# Patient Record
Sex: FEMALE | Race: WHITE | NOT HISPANIC OR LATINO | Employment: OTHER | URBAN - METROPOLITAN AREA
[De-identification: names, ages, dates, MRNs, and addresses within clinical notes are randomized per-mention and may not be internally consistent; named-entity substitution may affect disease eponyms.]

---

## 2017-06-12 ENCOUNTER — CONVERSION ENCOUNTER (OUTPATIENT)
Dept: GENERAL RADIOLOGY | Facility: HOSPITAL | Age: 72
End: 2017-06-12

## 2018-06-07 ENCOUNTER — CONVERSION ENCOUNTER (OUTPATIENT)
Dept: GENERAL RADIOLOGY | Facility: HOSPITAL | Age: 73
End: 2018-06-07

## 2018-09-17 ENCOUNTER — OFFICE VISIT CONVERTED (OUTPATIENT)
Dept: ORTHOPEDIC SURGERY | Facility: CLINIC | Age: 73
End: 2018-09-17
Attending: ORTHOPAEDIC SURGERY

## 2018-10-24 ENCOUNTER — OFFICE VISIT CONVERTED (OUTPATIENT)
Dept: ORTHOPEDIC SURGERY | Facility: CLINIC | Age: 73
End: 2018-10-24
Attending: ORTHOPAEDIC SURGERY

## 2018-11-14 ENCOUNTER — OFFICE VISIT CONVERTED (OUTPATIENT)
Dept: ORTHOPEDIC SURGERY | Facility: CLINIC | Age: 73
End: 2018-11-14
Attending: PHYSICIAN ASSISTANT

## 2018-11-14 ENCOUNTER — CONVERSION ENCOUNTER (OUTPATIENT)
Dept: ORTHOPEDIC SURGERY | Facility: CLINIC | Age: 73
End: 2018-11-14

## 2018-12-26 ENCOUNTER — OFFICE VISIT CONVERTED (OUTPATIENT)
Dept: ORTHOPEDIC SURGERY | Facility: CLINIC | Age: 73
End: 2018-12-26
Attending: PHYSICIAN ASSISTANT

## 2019-02-06 ENCOUNTER — OFFICE VISIT CONVERTED (OUTPATIENT)
Dept: ORTHOPEDIC SURGERY | Facility: CLINIC | Age: 74
End: 2019-02-06
Attending: PHYSICIAN ASSISTANT

## 2019-02-06 ENCOUNTER — CONVERSION ENCOUNTER (OUTPATIENT)
Dept: ORTHOPEDIC SURGERY | Facility: CLINIC | Age: 74
End: 2019-02-06

## 2019-02-12 ENCOUNTER — HOSPITAL ENCOUNTER (OUTPATIENT)
Dept: ORTHOPEDIC SURGERY | Facility: CLINIC | Age: 74
Setting detail: RECURRING SERIES
Discharge: HOME | End: 2019-03-21
Attending: ORTHOPAEDIC SURGERY

## 2019-03-06 ENCOUNTER — OFFICE VISIT CONVERTED (OUTPATIENT)
Dept: ORTHOPEDIC SURGERY | Facility: CLINIC | Age: 74
End: 2019-03-06
Attending: PHYSICIAN ASSISTANT

## 2019-03-12 ENCOUNTER — HOSPITAL ENCOUNTER (OUTPATIENT)
Dept: GENERAL RADIOLOGY | Facility: HOSPITAL | Age: 74
Discharge: HOME | End: 2019-03-12
Attending: PHYSICIAN ASSISTANT

## 2019-03-13 ENCOUNTER — CONVERSION ENCOUNTER (OUTPATIENT)
Dept: ORTHOPEDIC SURGERY | Facility: CLINIC | Age: 74
End: 2019-03-13

## 2019-03-13 ENCOUNTER — OFFICE VISIT CONVERTED (OUTPATIENT)
Dept: ORTHOPEDIC SURGERY | Facility: CLINIC | Age: 74
End: 2019-03-13
Attending: PHYSICIAN ASSISTANT

## 2019-06-13 ENCOUNTER — HOSPITAL ENCOUNTER (OUTPATIENT)
Dept: URGENT CARE | Facility: CLINIC | Age: 74
Discharge: HOME | End: 2019-06-13
Attending: NURSE PRACTITIONER

## 2019-07-10 ENCOUNTER — HOSPITAL ENCOUNTER (OUTPATIENT)
Dept: GENERAL RADIOLOGY | Facility: HOSPITAL | Age: 74
Discharge: HOME | End: 2019-07-10
Attending: NURSE PRACTITIONER

## 2019-07-30 ENCOUNTER — HOSPITAL ENCOUNTER (OUTPATIENT)
Dept: LAB | Facility: HOSPITAL | Age: 74
Discharge: HOME | End: 2019-07-30
Attending: INTERNAL MEDICINE

## 2019-07-30 LAB
25(OH)D3 SERPL-MCNC: 31.5 NG/ML (ref 30–100)
ALBUMIN SERPL-MCNC: 4 G/DL (ref 3.5–5)
ANION GAP SERPL CALC-SCNC: 22 MMOL/L (ref 8–19)
APPEARANCE UR: CLEAR
BASOPHILS # BLD AUTO: 0.03 10*3/UL (ref 0–0.2)
BASOPHILS NFR BLD AUTO: 0.5 % (ref 0–3)
BILIRUB UR QL: NEGATIVE
BUN SERPL-MCNC: 34 MG/DL (ref 5–25)
BUN/CREAT SERPL: 22 {RATIO} (ref 6–20)
CALCIUM SERPL-MCNC: 9.9 MG/DL (ref 8.7–10.4)
CHLORIDE SERPL-SCNC: 104 MMOL/L (ref 99–111)
COLOR UR: YELLOW
CONV ABS IMM GRAN: 0.05 10*3/UL (ref 0–0.2)
CONV CO2: 17 MMOL/L (ref 22–32)
CONV COLLECTION SOURCE (UA): NORMAL
CONV CREATININE URINE, RANDOM: 53 MG/DL (ref 10–300)
CONV IMMATURE GRAN: 0.8 % (ref 0–1.8)
CONV UROBILINOGEN IN URINE BY AUTOMATED TEST STRIP: 0.2 {EHRLICHU}/DL (ref 0.1–1)
CREAT UR-MCNC: 1.52 MG/DL (ref 0.5–0.9)
DEPRECATED RDW RBC AUTO: 52.3 FL (ref 36.4–46.3)
EOSINOPHIL # BLD AUTO: 0.14 10*3/UL (ref 0–0.7)
EOSINOPHIL # BLD AUTO: 2.2 % (ref 0–7)
ERYTHROCYTE [DISTWIDTH] IN BLOOD BY AUTOMATED COUNT: 13.7 % (ref 11.7–14.4)
GFR SERPLBLD BASED ON 1.73 SQ M-ARVRAT: 34 ML/MIN/{1.73_M2}
GLUCOSE SERPL-MCNC: 103 MG/DL (ref 65–99)
GLUCOSE UR QL: NEGATIVE MG/DL
HBA1C MFR BLD: 11.8 G/DL (ref 12–16)
HCT VFR BLD AUTO: 37.8 % (ref 37–47)
HGB UR QL STRIP: NEGATIVE
KETONES UR QL STRIP: NEGATIVE MG/DL
LEUKOCYTE ESTERASE UR QL STRIP: NEGATIVE
LYMPHOCYTES # BLD AUTO: 2.18 10*3/UL (ref 1–5)
MCH RBC QN AUTO: 32.7 PG (ref 27–31)
MCHC RBC AUTO-ENTMCNC: 31.2 G/DL (ref 33–37)
MCV RBC AUTO: 104.7 FL (ref 81–99)
MONOCYTES # BLD AUTO: 0.37 10*3/UL (ref 0.2–1.2)
MONOCYTES NFR BLD AUTO: 5.8 % (ref 3–10)
NEUTROPHILS # BLD AUTO: 3.6 10*3/UL (ref 2–8)
NEUTROPHILS NFR BLD AUTO: 56.5 % (ref 30–85)
NITRITE UR QL STRIP: NEGATIVE
NRBC CBCN: 0 % (ref 0–0.7)
PH UR STRIP.AUTO: 5.5 [PH] (ref 5–8)
PHOSPHATE SERPL-MCNC: 3.5 MG/DL (ref 2.4–4.5)
PLATELET # BLD AUTO: 256 10*3/UL (ref 130–400)
PMV BLD AUTO: 10 FL (ref 9.4–12.3)
POTASSIUM SERPL-SCNC: 5.9 MMOL/L (ref 3.5–5.3)
PROT UR QL: NEGATIVE MG/DL
PROT UR-MCNC: 5 MG/DL
PTH-INTACT SERPL-MCNC: 24.4 PG/ML (ref 11.1–79.5)
RBC # BLD AUTO: 3.61 10*6/UL (ref 4.2–5.4)
SODIUM SERPL-SCNC: 137 MMOL/L (ref 135–147)
SP GR UR: 1.02 (ref 1–1.03)
VARIANT LYMPHS NFR BLD MANUAL: 34.2 % (ref 20–45)
WBC # BLD AUTO: 6.37 10*3/UL (ref 4.8–10.8)

## 2019-08-21 ENCOUNTER — HOSPITAL ENCOUNTER (OUTPATIENT)
Dept: LAB | Facility: HOSPITAL | Age: 74
Discharge: HOME | End: 2019-08-21
Attending: INTERNAL MEDICINE

## 2019-08-21 LAB
ANION GAP SERPL CALC-SCNC: 17 MMOL/L (ref 8–19)
BUN SERPL-MCNC: 24 MG/DL (ref 5–25)
BUN/CREAT SERPL: 25 {RATIO} (ref 6–20)
CALCIUM SERPL-MCNC: 9.3 MG/DL (ref 8.7–10.4)
CHLORIDE SERPL-SCNC: 104 MMOL/L (ref 99–111)
CONV CO2: 21 MMOL/L (ref 22–32)
CREAT UR-MCNC: 0.97 MG/DL (ref 0.5–0.9)
GFR SERPLBLD BASED ON 1.73 SQ M-ARVRAT: 58 ML/MIN/{1.73_M2}
GLUCOSE SERPL-MCNC: 104 MG/DL (ref 65–99)
OSMOLALITY SERPL CALC.SUM OF ELEC: 290 MOSM/KG (ref 273–304)
POTASSIUM SERPL-SCNC: 4.4 MMOL/L (ref 3.5–5.3)
SODIUM SERPL-SCNC: 138 MMOL/L (ref 135–147)

## 2019-11-11 ENCOUNTER — HOSPITAL ENCOUNTER (OUTPATIENT)
Dept: LAB | Facility: HOSPITAL | Age: 74
Discharge: HOME | End: 2019-11-11
Attending: INTERNAL MEDICINE

## 2019-11-11 LAB
ALBUMIN SERPL-MCNC: 4.2 G/DL (ref 3.5–5)
ANION GAP SERPL CALC-SCNC: 20 MMOL/L (ref 8–19)
BASOPHILS # BLD AUTO: 0.04 10*3/UL (ref 0–0.2)
BASOPHILS NFR BLD AUTO: 0.7 % (ref 0–3)
BUN SERPL-MCNC: 24 MG/DL (ref 5–25)
BUN/CREAT SERPL: 22 {RATIO} (ref 6–20)
CALCIUM SERPL-MCNC: 9.6 MG/DL (ref 8.7–10.4)
CHLORIDE SERPL-SCNC: 102 MMOL/L (ref 99–111)
CONV ABS IMM GRAN: 0.02 10*3/UL (ref 0–0.2)
CONV CO2: 22 MMOL/L (ref 22–32)
CONV IMMATURE GRAN: 0.4 % (ref 0–1.8)
CREAT UR-MCNC: 1.09 MG/DL (ref 0.5–0.9)
DEPRECATED RDW RBC AUTO: 51.5 FL (ref 36.4–46.3)
EOSINOPHIL # BLD AUTO: 0.07 10*3/UL (ref 0–0.7)
EOSINOPHIL # BLD AUTO: 1.2 % (ref 0–7)
ERYTHROCYTE [DISTWIDTH] IN BLOOD BY AUTOMATED COUNT: 13 % (ref 11.7–14.4)
GFR SERPLBLD BASED ON 1.73 SQ M-ARVRAT: 50 ML/MIN/{1.73_M2}
GLUCOSE SERPL-MCNC: 112 MG/DL (ref 65–99)
HCT VFR BLD AUTO: 39.4 % (ref 37–47)
HGB BLD-MCNC: 12.4 G/DL (ref 12–16)
LYMPHOCYTES # BLD AUTO: 1.6 10*3/UL (ref 1–5)
LYMPHOCYTES NFR BLD AUTO: 28.4 % (ref 20–45)
MCH RBC QN AUTO: 33.9 PG (ref 27–31)
MCHC RBC AUTO-ENTMCNC: 31.5 G/DL (ref 33–37)
MCV RBC AUTO: 107.7 FL (ref 81–99)
MONOCYTES # BLD AUTO: 0.41 10*3/UL (ref 0.2–1.2)
MONOCYTES NFR BLD AUTO: 7.3 % (ref 3–10)
NEUTROPHILS # BLD AUTO: 3.5 10*3/UL (ref 2–8)
NEUTROPHILS NFR BLD AUTO: 62 % (ref 30–85)
NRBC CBCN: 0 % (ref 0–0.7)
PHOSPHATE SERPL-MCNC: 2.9 MG/DL (ref 2.4–4.5)
PLATELET # BLD AUTO: 219 10*3/UL (ref 130–400)
PMV BLD AUTO: 10 FL (ref 9.4–12.3)
POTASSIUM SERPL-SCNC: 3.9 MMOL/L (ref 3.5–5.3)
RBC # BLD AUTO: 3.66 10*6/UL (ref 4.2–5.4)
SODIUM SERPL-SCNC: 140 MMOL/L (ref 135–147)
WBC # BLD AUTO: 5.64 10*3/UL (ref 4.8–10.8)

## 2020-03-11 ENCOUNTER — HOSPITAL ENCOUNTER (OUTPATIENT)
Dept: OTHER | Facility: HOSPITAL | Age: 75
Discharge: HOME | End: 2020-03-11

## 2020-03-11 LAB
25(OH)D3 SERPL-MCNC: 24.3 NG/ML (ref 30–100)
ALBUMIN SERPL-MCNC: 3.5 G/DL (ref 3.5–5)
ALBUMIN/GLOB SERPL: 1.1 {RATIO} (ref 1.4–2.6)
ALP SERPL-CCNC: 60 U/L (ref 43–160)
ALT SERPL-CCNC: 16 U/L (ref 10–40)
ANION GAP SERPL CALC-SCNC: 20 MMOL/L (ref 8–19)
AST SERPL-CCNC: 28 U/L (ref 15–50)
BASOPHILS # BLD AUTO: 0.03 10*3/UL (ref 0–0.2)
BASOPHILS NFR BLD AUTO: 0.4 % (ref 0–3)
BILIRUB SERPL-MCNC: 0.32 MG/DL (ref 0.2–1.3)
BUN SERPL-MCNC: 33 MG/DL (ref 5–25)
BUN/CREAT SERPL: 31 {RATIO} (ref 6–20)
CALCIUM SERPL-MCNC: 10 MG/DL (ref 8.7–10.4)
CHLORIDE SERPL-SCNC: 104 MMOL/L (ref 99–111)
CONV ABS IMM GRAN: 0.03 10*3/UL (ref 0–0.2)
CONV CO2: 22 MMOL/L (ref 22–32)
CONV IMMATURE GRAN: 0.4 % (ref 0–1.8)
CONV TOTAL PROTEIN: 6.6 G/DL (ref 6.3–8.2)
CREAT UR-MCNC: 1.08 MG/DL (ref 0.5–0.9)
DEPRECATED RDW RBC AUTO: 46.5 FL (ref 36.4–46.3)
EOSINOPHIL # BLD AUTO: 0.21 10*3/UL (ref 0–0.7)
EOSINOPHIL # BLD AUTO: 2.8 % (ref 0–7)
ERYTHROCYTE [DISTWIDTH] IN BLOOD BY AUTOMATED COUNT: 12.2 % (ref 11.7–14.4)
GFR SERPLBLD BASED ON 1.73 SQ M-ARVRAT: 51 ML/MIN/{1.73_M2}
GLOBULIN UR ELPH-MCNC: 3.1 G/DL (ref 2–3.5)
GLUCOSE SERPL-MCNC: 119 MG/DL (ref 65–99)
HCT VFR BLD AUTO: 37.2 % (ref 37–47)
HGB BLD-MCNC: 11.9 G/DL (ref 12–16)
LYMPHOCYTES # BLD AUTO: 2.14 10*3/UL (ref 1–5)
LYMPHOCYTES NFR BLD AUTO: 28.4 % (ref 20–45)
MCH RBC QN AUTO: 33.1 PG (ref 27–31)
MCHC RBC AUTO-ENTMCNC: 32 G/DL (ref 33–37)
MCV RBC AUTO: 103.3 FL (ref 81–99)
MONOCYTES # BLD AUTO: 0.72 10*3/UL (ref 0.2–1.2)
MONOCYTES NFR BLD AUTO: 9.6 % (ref 3–10)
NEUTROPHILS # BLD AUTO: 4.4 10*3/UL (ref 2–8)
NEUTROPHILS NFR BLD AUTO: 58.4 % (ref 30–85)
NRBC CBCN: 0 % (ref 0–0.7)
OSMOLALITY SERPL CALC.SUM OF ELEC: 302 MOSM/KG (ref 273–304)
PLATELET # BLD AUTO: 191 10*3/UL (ref 130–400)
PMV BLD AUTO: 10.3 FL (ref 9.4–12.3)
POTASSIUM SERPL-SCNC: 4.4 MMOL/L (ref 3.5–5.3)
RBC # BLD AUTO: 3.6 10*6/UL (ref 4.2–5.4)
SODIUM SERPL-SCNC: 142 MMOL/L (ref 135–147)
WBC # BLD AUTO: 7.53 10*3/UL (ref 4.8–10.8)

## 2020-03-19 ENCOUNTER — HOSPITAL ENCOUNTER (OUTPATIENT)
Dept: OTHER | Facility: HOSPITAL | Age: 75
Discharge: HOME | End: 2020-03-19

## 2020-03-19 LAB
ANION GAP SERPL CALC-SCNC: 14 MMOL/L (ref 8–19)
BUN SERPL-MCNC: 38 MG/DL (ref 5–25)
BUN/CREAT SERPL: 39 {RATIO} (ref 6–20)
CALCIUM SERPL-MCNC: 8.7 MG/DL (ref 8.7–10.4)
CHLORIDE SERPL-SCNC: 103 MMOL/L (ref 99–111)
CONV CO2: 23 MMOL/L (ref 22–32)
CREAT UR-MCNC: 0.97 MG/DL (ref 0.5–0.9)
GFR SERPLBLD BASED ON 1.73 SQ M-ARVRAT: 58 ML/MIN/{1.73_M2}
GLUCOSE SERPL-MCNC: 97 MG/DL (ref 65–99)
OSMOLALITY SERPL CALC.SUM OF ELEC: 291 MOSM/KG (ref 273–304)
POTASSIUM SERPL-SCNC: 3.8 MMOL/L (ref 3.5–5.3)
SODIUM SERPL-SCNC: 136 MMOL/L (ref 135–147)

## 2020-03-26 ENCOUNTER — OFFICE VISIT CONVERTED (OUTPATIENT)
Dept: ORTHOPEDIC SURGERY | Facility: CLINIC | Age: 75
End: 2020-03-26
Attending: ORTHOPAEDIC SURGERY

## 2020-05-06 ENCOUNTER — HOSPITAL ENCOUNTER (OUTPATIENT)
Dept: LAB | Facility: HOSPITAL | Age: 75
Discharge: HOME | End: 2020-05-06
Attending: INTERNAL MEDICINE

## 2020-05-06 LAB
25(OH)D3 SERPL-MCNC: 39.7 NG/ML (ref 30–100)
ALBUMIN SERPL-MCNC: 4.5 G/DL (ref 3.5–5)
ANION GAP SERPL CALC-SCNC: 26 MMOL/L (ref 8–19)
APPEARANCE UR: CLEAR
BASOPHILS # BLD AUTO: 0.06 10*3/UL (ref 0–0.2)
BASOPHILS NFR BLD AUTO: 0.8 % (ref 0–3)
BILIRUB UR QL: NEGATIVE
BUN SERPL-MCNC: 38 MG/DL (ref 5–25)
BUN/CREAT SERPL: 35 {RATIO} (ref 6–20)
CALCIUM SERPL-MCNC: 10.7 MG/DL (ref 8.7–10.4)
CHLORIDE SERPL-SCNC: 110 MMOL/L (ref 99–111)
COLOR UR: YELLOW
CONV ABS IMM GRAN: 0.02 10*3/UL (ref 0–0.2)
CONV BACTERIA: ABNORMAL
CONV CO2: 18 MMOL/L (ref 22–32)
CONV COLLECTION SOURCE (UA): ABNORMAL
CONV CREATININE URINE, RANDOM: 74.1 MG/DL (ref 10–300)
CONV HYALINE CASTS IN URINE MICRO: ABNORMAL /[LPF]
CONV IMMATURE GRAN: 0.3 % (ref 0–1.8)
CONV PROTEIN TO CREATININE RATIO (RANDOM URINE): 0.1 {RATIO} (ref 0–0.1)
CONV UROBILINOGEN IN URINE BY AUTOMATED TEST STRIP: 0.2 {EHRLICHU}/DL (ref 0.1–1)
CREAT UR-MCNC: 1.08 MG/DL (ref 0.5–0.9)
DEPRECATED RDW RBC AUTO: 50.5 FL (ref 36.4–46.3)
EOSINOPHIL # BLD AUTO: 0.24 10*3/UL (ref 0–0.7)
EOSINOPHIL # BLD AUTO: 3.2 % (ref 0–7)
ERYTHROCYTE [DISTWIDTH] IN BLOOD BY AUTOMATED COUNT: 12.8 % (ref 11.7–14.4)
GFR SERPLBLD BASED ON 1.73 SQ M-ARVRAT: 51 ML/MIN/{1.73_M2}
GLUCOSE SERPL-MCNC: 120 MG/DL (ref 65–99)
GLUCOSE UR QL: NEGATIVE MG/DL
HCT VFR BLD AUTO: 43.4 % (ref 37–47)
HGB BLD-MCNC: 13.7 G/DL (ref 12–16)
HGB UR QL STRIP: NEGATIVE
KETONES UR QL STRIP: NEGATIVE MG/DL
LEUKOCYTE ESTERASE UR QL STRIP: ABNORMAL
LYMPHOCYTES # BLD AUTO: 2.08 10*3/UL (ref 1–5)
LYMPHOCYTES NFR BLD AUTO: 27.5 % (ref 20–45)
MCH RBC QN AUTO: 33.6 PG (ref 27–31)
MCHC RBC AUTO-ENTMCNC: 31.6 G/DL (ref 33–37)
MCV RBC AUTO: 106.4 FL (ref 81–99)
MONOCYTES # BLD AUTO: 0.49 10*3/UL (ref 0.2–1.2)
MONOCYTES NFR BLD AUTO: 6.5 % (ref 3–10)
NEUTROPHILS # BLD AUTO: 4.66 10*3/UL (ref 2–8)
NEUTROPHILS NFR BLD AUTO: 61.7 % (ref 30–85)
NITRITE UR QL STRIP: NEGATIVE
NRBC CBCN: 0 % (ref 0–0.7)
PH UR STRIP.AUTO: 5.5 [PH] (ref 5–8)
PHOSPHATE SERPL-MCNC: 3.2 MG/DL (ref 2.4–4.5)
PLATELET # BLD AUTO: 216 10*3/UL (ref 130–400)
PMV BLD AUTO: 10.5 FL (ref 9.4–12.3)
POTASSIUM SERPL-SCNC: 5.1 MMOL/L (ref 3.5–5.3)
PROT UR QL: NEGATIVE MG/DL
PROT UR-MCNC: 7.1 MG/DL
PTH-INTACT SERPL-MCNC: 26.1 PG/ML (ref 11.1–79.5)
RBC # BLD AUTO: 4.08 10*6/UL (ref 4.2–5.4)
RBC #/AREA URNS HPF: ABNORMAL /[HPF]
SODIUM SERPL-SCNC: 149 MMOL/L (ref 135–147)
SP GR UR: 1.02 (ref 1–1.03)
WBC # BLD AUTO: 7.55 10*3/UL (ref 4.8–10.8)
WBC #/AREA URNS HPF: ABNORMAL /[HPF]

## 2020-05-14 ENCOUNTER — OFFICE VISIT CONVERTED (OUTPATIENT)
Dept: ORTHOPEDIC SURGERY | Facility: CLINIC | Age: 75
End: 2020-05-14
Attending: ORTHOPAEDIC SURGERY

## 2020-10-20 ENCOUNTER — HOSPITAL ENCOUNTER (OUTPATIENT)
Dept: GENERAL RADIOLOGY | Facility: HOSPITAL | Age: 75
Discharge: HOME | End: 2020-10-20
Attending: NURSE PRACTITIONER

## 2020-11-03 ENCOUNTER — HOSPITAL ENCOUNTER (OUTPATIENT)
Dept: LAB | Facility: HOSPITAL | Age: 75
Discharge: HOME | End: 2020-11-03
Attending: NURSE PRACTITIONER

## 2020-11-03 LAB
25(OH)D3 SERPL-MCNC: 26.2 NG/ML (ref 30–100)
ALBUMIN SERPL-MCNC: 4.4 G/DL (ref 3.5–5)
ANION GAP SERPL CALC-SCNC: 19 MMOL/L (ref 8–19)
APPEARANCE UR: CLEAR
BASOPHILS # BLD AUTO: 0.04 10*3/UL (ref 0–0.2)
BASOPHILS NFR BLD AUTO: 0.6 % (ref 0–3)
BILIRUB UR QL: NEGATIVE
BUN SERPL-MCNC: 28 MG/DL (ref 5–25)
BUN/CREAT SERPL: 24 {RATIO} (ref 6–20)
CALCIUM SERPL-MCNC: 9.6 MG/DL (ref 8.7–10.4)
CHLORIDE SERPL-SCNC: 103 MMOL/L (ref 99–111)
COLOR UR: YELLOW
CONV ABS IMM GRAN: 0.01 10*3/UL (ref 0–0.2)
CONV BACTERIA: NEGATIVE
CONV CO2: 21 MMOL/L (ref 22–32)
CONV COLLECTION SOURCE (UA): ABNORMAL
CONV CREATININE URINE, RANDOM: 52.1 MG/DL (ref 10–300)
CONV IMMATURE GRAN: 0.2 % (ref 0–1.8)
CONV PROTEIN TO CREATININE RATIO (RANDOM URINE): 0.09 {RATIO} (ref 0–0.1)
CONV UROBILINOGEN IN URINE BY AUTOMATED TEST STRIP: 0.2 {EHRLICHU}/DL (ref 0.1–1)
CREAT UR-MCNC: 1.17 MG/DL (ref 0.5–0.9)
DEPRECATED RDW RBC AUTO: 47.7 FL (ref 36.4–46.3)
EOSINOPHIL # BLD AUTO: 0.16 10*3/UL (ref 0–0.7)
EOSINOPHIL # BLD AUTO: 2.6 % (ref 0–7)
ERYTHROCYTE [DISTWIDTH] IN BLOOD BY AUTOMATED COUNT: 12.5 % (ref 11.7–14.4)
GFR SERPLBLD BASED ON 1.73 SQ M-ARVRAT: 46 ML/MIN/{1.73_M2}
GLUCOSE SERPL-MCNC: 116 MG/DL (ref 65–99)
GLUCOSE UR QL: NEGATIVE MG/DL
HCT VFR BLD AUTO: 45.6 % (ref 37–47)
HGB BLD-MCNC: 14.7 G/DL (ref 12–16)
HGB UR QL STRIP: ABNORMAL
KETONES UR QL STRIP: NEGATIVE MG/DL
LEUKOCYTE ESTERASE UR QL STRIP: ABNORMAL
LYMPHOCYTES # BLD AUTO: 2.21 10*3/UL (ref 1–5)
LYMPHOCYTES NFR BLD AUTO: 35.4 % (ref 20–45)
MCH RBC QN AUTO: 33.1 PG (ref 27–31)
MCHC RBC AUTO-ENTMCNC: 32.2 G/DL (ref 33–37)
MCV RBC AUTO: 102.7 FL (ref 81–99)
MONOCYTES # BLD AUTO: 0.4 10*3/UL (ref 0.2–1.2)
MONOCYTES NFR BLD AUTO: 6.4 % (ref 3–10)
NEUTROPHILS # BLD AUTO: 3.43 10*3/UL (ref 2–8)
NEUTROPHILS NFR BLD AUTO: 54.8 % (ref 30–85)
NITRITE UR QL STRIP: NEGATIVE
NRBC CBCN: 0 % (ref 0–0.7)
PH UR STRIP.AUTO: 6.5 [PH] (ref 5–8)
PHOSPHATE SERPL-MCNC: 3.3 MG/DL (ref 2.4–4.5)
PLATELET # BLD AUTO: 234 10*3/UL (ref 130–400)
PMV BLD AUTO: 10.2 FL (ref 9.4–12.3)
POTASSIUM SERPL-SCNC: 4 MMOL/L (ref 3.5–5.3)
PROT UR QL: NEGATIVE MG/DL
PROT UR-MCNC: 4.5 MG/DL
PTH-INTACT SERPL-MCNC: 40.5 PG/ML (ref 11.1–79.5)
RBC # BLD AUTO: 4.44 10*6/UL (ref 4.2–5.4)
RBC #/AREA URNS HPF: ABNORMAL /[HPF]
SODIUM SERPL-SCNC: 139 MMOL/L (ref 135–147)
SP GR UR: 1.01 (ref 1–1.03)
SQUAMOUS SPT QL MICRO: ABNORMAL /[HPF]
WBC # BLD AUTO: 6.25 10*3/UL (ref 4.8–10.8)
WBC #/AREA URNS HPF: ABNORMAL /[HPF]

## 2021-02-24 ENCOUNTER — HOSPITAL ENCOUNTER (OUTPATIENT)
Dept: VACCINE CLINIC | Facility: HOSPITAL | Age: 76
Discharge: HOME | End: 2021-02-24
Attending: INTERNAL MEDICINE

## 2021-03-10 ENCOUNTER — HOSPITAL ENCOUNTER (OUTPATIENT)
Dept: NUCLEAR MEDICINE | Facility: HOSPITAL | Age: 76
Discharge: HOME | End: 2021-03-10
Attending: NURSE PRACTITIONER

## 2021-03-15 ENCOUNTER — OFFICE VISIT CONVERTED (OUTPATIENT)
Dept: SURGERY | Facility: CLINIC | Age: 76
End: 2021-03-15
Attending: SURGERY

## 2021-03-15 ENCOUNTER — HOSPITAL ENCOUNTER (OUTPATIENT)
Dept: PREADMISSION TESTING | Facility: HOSPITAL | Age: 76
Discharge: HOME | End: 2021-03-15
Attending: SURGERY

## 2021-03-16 LAB — SARS-COV-2 RNA SPEC QL NAA+PROBE: NOT DETECTED

## 2021-03-18 ENCOUNTER — HOSPITAL ENCOUNTER (OUTPATIENT)
Dept: PERIOP | Facility: HOSPITAL | Age: 76
Setting detail: HOSPITAL OUTPATIENT SURGERY
Discharge: HOME | End: 2021-03-18
Attending: SURGERY

## 2021-03-18 LAB
ANION GAP SERPL CALC-SCNC: 14 MMOL/L (ref 8–19)
BUN SERPL-MCNC: 13 MG/DL (ref 5–25)
BUN/CREAT SERPL: 15 {RATIO} (ref 6–20)
CALCIUM SERPL-MCNC: 9.8 MG/DL (ref 8.7–10.4)
CHLORIDE SERPL-SCNC: 112 MMOL/L (ref 99–111)
CONV CO2: 20 MMOL/L (ref 22–32)
CREAT UR-MCNC: 0.89 MG/DL (ref 0.5–0.9)
GFR SERPLBLD BASED ON 1.73 SQ M-ARVRAT: >60 ML/MIN/{1.73_M2}
GLUCOSE BLD-MCNC: 105 MG/DL (ref 65–99)
GLUCOSE SERPL-MCNC: 113 MG/DL (ref 65–99)
OSMOLALITY SERPL CALC.SUM OF ELEC: 295 MOSM/KG (ref 273–304)
POTASSIUM SERPL-SCNC: 4.2 MMOL/L (ref 3.5–5.3)
SODIUM SERPL-SCNC: 142 MMOL/L (ref 135–147)

## 2021-03-25 ENCOUNTER — HOSPITAL ENCOUNTER (OUTPATIENT)
Dept: VACCINE CLINIC | Facility: HOSPITAL | Age: 76
Discharge: HOME | End: 2021-03-25
Attending: INTERNAL MEDICINE

## 2021-04-02 ENCOUNTER — OFFICE VISIT CONVERTED (OUTPATIENT)
Dept: SURGERY | Facility: CLINIC | Age: 76
End: 2021-04-02
Attending: SURGERY

## 2021-04-26 ENCOUNTER — HOSPITAL ENCOUNTER (OUTPATIENT)
Dept: LAB | Facility: HOSPITAL | Age: 76
Discharge: HOME | End: 2021-04-26
Attending: INTERNAL MEDICINE

## 2021-04-26 LAB
25(OH)D3 SERPL-MCNC: 35.2 NG/ML (ref 30–100)
ALBUMIN SERPL-MCNC: 4.3 G/DL (ref 3.5–5)
ANION GAP SERPL CALC-SCNC: 17 MMOL/L (ref 8–19)
APPEARANCE UR: CLEAR
BASOPHILS # BLD AUTO: 0.04 10*3/UL (ref 0–0.2)
BASOPHILS NFR BLD AUTO: 0.7 % (ref 0–3)
BILIRUB UR QL: NEGATIVE
BUN SERPL-MCNC: 28 MG/DL (ref 5–25)
BUN/CREAT SERPL: 25 {RATIO} (ref 6–20)
CALCIUM SERPL-MCNC: 9.6 MG/DL (ref 8.7–10.4)
CHLORIDE SERPL-SCNC: 103 MMOL/L (ref 99–111)
COLOR UR: YELLOW
CONV ABS IMM GRAN: 0.01 10*3/UL (ref 0–0.2)
CONV CO2: 25 MMOL/L (ref 22–32)
CONV COLLECTION SOURCE (UA): NORMAL
CONV CREATININE URINE, RANDOM: 39 MG/DL (ref 10–300)
CONV IMMATURE GRAN: 0.2 % (ref 0–1.8)
CONV PROTEIN TO CREATININE RATIO (RANDOM URINE): 0.12 {RATIO} (ref 0–0.1)
CONV UROBILINOGEN IN URINE BY AUTOMATED TEST STRIP: 0.2 {EHRLICHU}/DL (ref 0.1–1)
CREAT UR-MCNC: 1.12 MG/DL (ref 0.5–0.9)
DEPRECATED RDW RBC AUTO: 48 FL (ref 36.4–46.3)
EOSINOPHIL # BLD AUTO: 0.65 10*3/UL (ref 0–0.7)
EOSINOPHIL # BLD AUTO: 11 % (ref 0–7)
ERYTHROCYTE [DISTWIDTH] IN BLOOD BY AUTOMATED COUNT: 13.2 % (ref 11.7–14.4)
GFR SERPLBLD BASED ON 1.73 SQ M-ARVRAT: 48 ML/MIN/{1.73_M2}
GLUCOSE SERPL-MCNC: 112 MG/DL (ref 65–99)
GLUCOSE UR QL: NEGATIVE MG/DL
HCT VFR BLD AUTO: 41 % (ref 37–47)
HGB BLD-MCNC: 13.5 G/DL (ref 12–16)
HGB UR QL STRIP: NEGATIVE
KETONES UR QL STRIP: NEGATIVE MG/DL
LEUKOCYTE ESTERASE UR QL STRIP: NEGATIVE
LYMPHOCYTES # BLD AUTO: 1.93 10*3/UL (ref 1–5)
LYMPHOCYTES NFR BLD AUTO: 32.7 % (ref 20–45)
MCH RBC QN AUTO: 32.5 PG (ref 27–31)
MCHC RBC AUTO-ENTMCNC: 32.9 G/DL (ref 33–37)
MCV RBC AUTO: 98.6 FL (ref 81–99)
MONOCYTES # BLD AUTO: 0.4 10*3/UL (ref 0.2–1.2)
MONOCYTES NFR BLD AUTO: 6.8 % (ref 3–10)
NEUTROPHILS # BLD AUTO: 2.88 10*3/UL (ref 2–8)
NEUTROPHILS NFR BLD AUTO: 48.6 % (ref 30–85)
NITRITE UR QL STRIP: NEGATIVE
NRBC CBCN: 0 % (ref 0–0.7)
PH UR STRIP.AUTO: 6.5 [PH] (ref 5–8)
PHOSPHATE SERPL-MCNC: 3.2 MG/DL (ref 2.4–4.5)
PLATELET # BLD AUTO: 204 10*3/UL (ref 130–400)
PMV BLD AUTO: 10.1 FL (ref 9.4–12.3)
POTASSIUM SERPL-SCNC: 4.6 MMOL/L (ref 3.5–5.3)
PROT UR QL: NEGATIVE MG/DL
PROT UR-MCNC: 4.7 MG/DL
PTH-INTACT SERPL-MCNC: 25.4 PG/ML (ref 11.1–79.5)
RBC # BLD AUTO: 4.16 10*6/UL (ref 4.2–5.4)
SODIUM SERPL-SCNC: 140 MMOL/L (ref 135–147)
SP GR UR: 1.01 (ref 1–1.03)
WBC # BLD AUTO: 5.91 10*3/UL (ref 4.8–10.8)

## 2021-05-10 NOTE — H&P
History and Physical      Patient Name: Salome Reeves   Patient ID: [id number]   Sex: Female   YOB: 1945    Primary Care Provider: Julian Polanco MD   Referring Provider: Jennie DALY    Visit Date: March 15, 2021    Provider: Ferny Connors MD   Location: [facility]   Location Address: [address] [place] [address]  [id number]   Location Phone: [phone]          Chief Complaint  · Outpatient History & Physical / Surgical Orders  · Gallbladder Consult      History Of Present Illness  Salome Reeves is a 75 year old /White female who presents to the office today as a consult from Jennie DALY.      Patient was referred for consideration for gallbladder removal.  The patient has been having diarrhea and epigastric pain.  Both occur after every meal regardless of the food type.  CT scan was done and and results were unremarkable except for the presence of gallstones.  Patient also had a HIDA scan done and it showed an ejection fraction of 90%.  Only prior abdominal surgeries are open appendectomy and complete hysterectomy.  Patient does have a small ventral hernia in the midline just above the umbilicus that is been present for many years and does not bother her in any way.  The referring clinicians note indicates that stool specimens were ordered and were negative including culture, Shigella, Giardia, and C. difficile.  I reviewed all of the referring clinicians note.  Patient has a diagnosis of chronic pancreatitis but says she has never drank alcohol very much at all and she had a common bile duct stent placed about 5 years ago that was subsequently removed and the patient said it had something to do with her diagnosis of chronic pancreatitis.  The patient takes Creon daily and has been taking it since the year 2009.  She says she does not have any lung or heart problems.       Past Medical History  Disease Name Date Onset Notes   Arthritis --  --     Bladder disorder --  --    Diabetes --  --    Hyperlipemia --  --    Hypertension --  --    Kidney Disease --  --    Limb Swelling --  --    Neurologic disorder --  --    Seasonal allergies --  --    Thyroid disorder --  --          Past Surgical History  Procedure Name Date Notes   Appendectomy --  --    Cataract surgery --  --    Colonoscopy --  --    Hysterectomy --  --    Parathyroidectomy 2014 --          Medication List  Name Date Started Instructions   Aspir-81 81 mg oral tablet,delayed release (DR/EC)  --    Creon oral  --    glimepiride 2 mg oral tablet  take 1 tablet (2 mg) by oral route once daily   lisinopril oral  --    lovastatin oral  --    metformin 500 mg oral tablet  take 1 tablet (500 mg) by oral route 2 times per day with morning and evening meals   primidone 250 mg oral tablet  take 1 tablet (250 mg) by oral route 3 times per day for maintenance   topiramate oral  --    Vitamin D3 oral  --    Voltaren 1 % topical gel 02/06/2019 apply 4 gram to the affected area(s) by topical route 4 times per day         Allergy List  Allergen Name Date Reaction Notes   Adhesive Tape --  --  --    Augmentin --  --  --          Family Medical History  Disease Name Relative/Age Notes   Cancer, Unspecified Father/  Mother/   Mother; Father   -None  --          Social History  Finding Status Start/Stop Quantity Notes   Alcohol Never 0/0 --  drinks no   Alcohol Use Never --/-- --  does not drink   Caffeine Current every day 0/0 --  drinks regularly; coffee; 1-2 times per day   Claustophobic Unknown --/-- --  yes   Homemaker. --  --/-- --  --    lives with spouse --  --/-- --  --    . --  --/-- --  --    Recreational Drug Use Never --/-- --  no   Retired. --  --/-- --  --    Second hand smoke exposure Never 0/0 --  no   Tobacco Never --/-- --  never smoker  never smoker  never a smoker         Review of Systems  · Constitutional  o Denies  o : fever, chills  · Cardiovascular  o Denies  o : chest pain on  "exertion  · Respiratory  o Denies  o : shortness of breath  · Gastrointestinal  o Admits  o : diarrhea  o Denies  o : nausea      Vitals  Date Time BP Position Site L\R Cuff Size HR RR TEMP (F) WT  HT  BMI kg/m2 BSA m2 O2 Sat FR L/min FiO2 HC       03/15/2021 10:43 AM       16  160lbs 6oz 5'  3\" 28.41 1.8             Physical Examination  · Constitutional  o Appearance  o : alert and in no acute distress, reliable historian,  present in room  · Head and Face  o Head  o :   § Inspection  § : no visable deformities or lesions  · Eyes  o Conjunctivae  o : clear, wearing glasses  o Sclerae  o : clear  · Neck  o Inspection/Palpation  o : normal appearance, no masses, trachea midline  · Respiratory  o Respiratory Effort  o : breathing unlabored, respiratory effort appears normal  o Inspection of Chest  o : normal appearance, no retractions  · Cardiovascular  o Heart  o : regular rate and rhythm  · Gastrointestinal  o Abdominal Examination  o :   § Abdomen  § : soft, nontender, nondistended. reducible nontender ventral hernia in midline above umbilicus  · Skin and Subcutaneous Tissue  o General Inspection  o : no visible concerning rashes or lesions present  · Neurologic  o Cranial Nerves  o : no obvious motor deficits  o Sensation  o : no obvious sensory deficits  o Gait and Station  o :   § Gait Screening  § : normal gait, able to stand without diffculty  o Cerebellar Function  o : no obvious abnormalities  · Psychiatric  o Judgement and Insight  o : judgment and insight intact  o Mood and Affect  o : mood normal, affect appropriate          Assessment  · Pre-Surgical Orders     V72.84  · Cholelithiasis     574.20/K80.20  · Preop testing     V72.84/Z01.818  · Epigastric abdominal pain     789.06/R10.13  · Diarrhea     787.91/R19.7      Plan  · Orders  o GENERAL SURGERY (GNSUR) - V72.84 - 03/18/2021  o [facility] Pre-Op Covid-19 Screening (41103) - V72.84/Z01.818 - 03/15/2021  · Medications  o Medications have been " Reconciled  o Transition of Care or Provider Policy  · Instructions  o PLAN: Laparoscopic Cholecystectomy with Intraoperative Cholangiogram.  o PLEASE SIGN PERMIT FOR: Laparoscopic Cholecystectomy with Intraoperative Cholangiogram  o CMP to be checked STAT on day of surgery if no CMP available within one week of surgery date.  o Anesthesia: General   o Anesthesia: MAC  o Outpatient  o O.R. PREP: Per protocol  o IV: Per Anesthesia  o SCD's preoperatively  o The indications, options, risks, benefits, and expected outcomes of the planned procedure were discussed with the patient and the patient agrees to proceed.   o Electronically Identified Patient Education Materials Provided Electronically     Patient has a diagnosis of chronic pancreatitis, it is unclear whether her abdominal pain will improve/resolve after gallbladder removal.  Also, is very unclear whether the patient's diarrhea will improve/resolve as well.  Nevertheless, the patient is very concerned about the gallstones and feels that the gallstones are the cause of her epigastric abdominal pain and wants to proceed with laparoscopic gallbladder removal with the clear understanding that her symptoms may not improve.             Electronically Signed by: Ferny Connors MD -Author on March 15, 2021 12:14:08 PM

## 2021-05-13 NOTE — PROGRESS NOTES
Progress Note      Patient Name: Salome Reeves   Patient ID: [id number]   Sex: Female   YOB: 1945    Primary Care Provider: Julian Polanco MD   Referring Provider: Carly Shell MD    Visit Date: May 14, 2020    Provider: Todd Tavarez MD   Location: [facility]   Location Address: [address] [place] [address]  [id number]   Location Phone: [phone]          Chief Complaint  · Left hip pain      History Of Present Illness  Salome Reeves is a 74 year old /White female who presents today to [facility].      Patient presents today for evaluation follow up with left greater trochanteric hip fracture. The patient is doing well today. She is having minimal pain in the hip. She notices occasional soreness and pain to the hip, but states that overall it is doing much better. She had to wait in a doctors office today in a chair and says this aggravated it.       Past Medical History  Arthritis; Bladder Disorder; Diabetes; Hyperlipemia; Hypertension; Kidney Disease; Limb Swelling; Neurologic disorder; Seasonal allergies; Thyroid disorder         Past Surgical History  Appendectomy; Cataract surgery; Colonoscopy; Hysterectomy; Parathyroidectomy         Medication List  Aspir-81 81 mg oral tablet,delayed release (DR/EC); Creon oral; Januvia oral; lisinopril oral; lovastatin oral; Percocet 7.5-325 mg oral tablet; topiramate oral; trihexyphenidyl oral; Vitamin D3 oral; Voltaren 1 % topical gel         Allergy List  Adhesive Tape; Augmentin         Family Medical History  Cancer, Unspecified; -None         Social History  Alcohol (Never); Alcohol Use (Never); Caffeine (Current every day); Claustophobic (Unknown); Homemaker.; lives with spouse; .; Recreational Drug Use (Never); Retired.; Second hand smoke exposure (Never); Tobacco (Never)         Review of Systems  · Constitutional  o Denies  o : fever, chills, weight loss  · Cardiovascular  o Denies  o : chest pain,  "shortness of breath  · Gastrointestinal  o Denies  o : liver disease, heartburn, nausea, blood in stools  · Genitourinary  o Denies  o : painful urination, blood in urine  · Integument  o Denies  o : rash, itching  · Neurologic  o Denies  o : headache, weakness, loss of consciousness  · Musculoskeletal  o Denies  o : painful, swollen joints  · Psychiatric  o Denies  o : drug/alcohol addiction, anxiety, depression      Vitals  Date Time BP Position Site L\R Cuff Size HR RR TEMP (F) WT  HT  BMI kg/m2 BSA m2 O2 Sat        05/14/2020 02:50 PM         160lbs 0oz 5'  3\" 28.34 1.8           Physical Examination  · Constitutional  o Appearance  o : well developed, well-nourished, no obvious deformities present  · Head and Face  o Head  o :   § Inspection  § : normocephalic  o Face  o :   § Inspection  § : no facial lesions  · Eyes  o Conjunctivae  o : conjunctivae normal  o Sclerae  o : sclerae white  · Ears, Nose, Mouth and Throat  o Ears  o :   § External Ears  § : appearance within normal limits  § Hearing  § : intact  o Nose  o :   § External Nose  § : appearance normal  · Neck  o Inspection/Palpation  o : normal appearance  o Range of Motion  o : full range of motion  · Respiratory  o Respiratory Effort  o : breathing unlabored  o Inspection of Chest  o : normal appearance  o Auscultation of Lungs  o : no audible wheezing or rales  · Cardiovascular  o Heart  o : regular rate  · Gastrointestinal  o Abdominal Examination  o : soft and non-tender  · Skin and Subcutaneous Tissue  o General Inspection  o : intact, no rashes  · Psychiatric  o General  o : Alert and oriented x3  o Judgement and Insight  o : judgment and insight intact  o Mood and Affect  o : mood normal, affect appropriate  · Left Hip  o Inspection  o : Non tender to palpation. ambulates with mild antalgic gait. ROM is intact. Neurovascularly intact. Sensation is grossly intact.           Assessment  · Greater trochanteric hip fracture, " Left     820.8/S72.009A  · Pain: Hip     719.45/M25.559      Plan  · Medications  o Medications have been Reconciled  o Transition of Care or Provider Policy  · Instructions  o Dr. Tavarez saw and examined the patient and agrees with plan.   o Reviewed the patient's Past Medical, Social, and Family history as well as the ROS at today's visit, no changes.  o Call or return if worsening symptoms.  o This note was transcribed by Juanjose Ferrell. jsb.  o Discussed plan with patient. She is doing well today. She may continue activity as tolerated. She will follow up with us if any problems arise.             Electronically Signed by: Edward Ferrell - , Other -Author on May 20, 2020 03:10:48 PM  Electronically Co-signed by: Todd Tavarez MD -Reviewer on May 20, 2020 08:22:35 PM

## 2021-05-14 VITALS — RESPIRATION RATE: 14 BRPM | WEIGHT: 160 LBS | HEIGHT: 63 IN | BODY MASS INDEX: 28.35 KG/M2

## 2021-05-14 VITALS — BODY MASS INDEX: 28.41 KG/M2 | HEIGHT: 63 IN | WEIGHT: 160.37 LBS | RESPIRATION RATE: 16 BRPM

## 2021-05-14 NOTE — PROGRESS NOTES
"   Progress Note      Patient Name: Salome Reeves   Patient ID: [id number]   Sex: Female   YOB: 1945    Primary Care Provider: Jennie DALY   Referring Provider: Jennie DALY    Visit Date: April 2, 2021    Provider: Ferny Connors MD   Location: [facility]   Location Address: [address] [place] [address]  [id number]   Location Phone: [phone]          Chief Complaint  · Follow up Surgery      History Of Present Illness  Salome Reeves is a 75 year old /White female who presents today for a postoperative visit.      Patient is here for follow-up after gallbladder removal.  She is doing great and has no complaints.  Patient looks fine today.  Abdominal exam is benign and incisions are healing well.  My assessment is the patient is recovering very well after her surgery.  She seems pleased with her progress and there are no new issues to address.  Patient will see me as needed       Vitals  Date Time BP Position Site L\R Cuff Size HR RR TEMP (F) WT  HT  BMI kg/m2 BSA m2 O2 Sat FR L/min FiO2 HC       04/02/2021 01:59 PM       14  160lbs 0oz 5'  3\" 28.34 1.8                 Assessment  · Postoperative Exam Following Surgery     V67.00      Plan  · Medications  o Medications have been Reconciled  o Transition of Care or Provider Policy  · Instructions  o See Above HPI section.  o Electronically Identified Patient Education Materials Provided Electronically            Electronically Signed by: Ferny Connors MD -Author on April 2, 2021 02:26:12 PM  "

## 2021-05-15 VITALS — BODY MASS INDEX: 28.35 KG/M2 | HEIGHT: 63 IN | WEIGHT: 160 LBS

## 2021-05-15 VITALS — HEIGHT: 63 IN | WEIGHT: 150 LBS | RESPIRATION RATE: 16 BRPM | BODY MASS INDEX: 26.58 KG/M2

## 2021-05-16 VITALS — HEART RATE: 96 BPM | HEIGHT: 63 IN | WEIGHT: 166 LBS | OXYGEN SATURATION: 98 % | BODY MASS INDEX: 29.41 KG/M2

## 2021-05-16 VITALS — HEIGHT: 63 IN | WEIGHT: 166 LBS | OXYGEN SATURATION: 98 % | HEART RATE: 105 BPM | BODY MASS INDEX: 29.41 KG/M2

## 2021-05-16 VITALS — HEART RATE: 127 BPM | OXYGEN SATURATION: 100 % | HEIGHT: 63 IN | WEIGHT: 172 LBS | BODY MASS INDEX: 30.48 KG/M2

## 2021-05-16 VITALS — HEART RATE: 99 BPM | HEIGHT: 63 IN | OXYGEN SATURATION: 93 %

## 2021-05-16 VITALS — HEIGHT: 63 IN | HEART RATE: 61 BPM | WEIGHT: 166 LBS | BODY MASS INDEX: 29.41 KG/M2 | OXYGEN SATURATION: 98 %

## 2021-05-16 VITALS — HEIGHT: 63 IN

## 2021-05-16 VITALS — WEIGHT: 171 LBS | HEART RATE: 89 BPM | BODY MASS INDEX: 30.3 KG/M2 | HEIGHT: 63 IN | OXYGEN SATURATION: 97 %

## 2021-08-02 ENCOUNTER — OFFICE VISIT (OUTPATIENT)
Dept: GASTROENTEROLOGY | Facility: CLINIC | Age: 76
End: 2021-08-02

## 2021-08-02 VITALS
HEART RATE: 88 BPM | HEIGHT: 63 IN | DIASTOLIC BLOOD PRESSURE: 72 MMHG | BODY MASS INDEX: 27.96 KG/M2 | WEIGHT: 157.8 LBS | SYSTOLIC BLOOD PRESSURE: 120 MMHG

## 2021-08-02 DIAGNOSIS — R19.7 DIARRHEA, UNSPECIFIED TYPE: ICD-10-CM

## 2021-08-02 DIAGNOSIS — K86.1 CHRONIC PANCREATITIS, UNSPECIFIED PANCREATITIS TYPE (HCC): Primary | ICD-10-CM

## 2021-08-02 PROBLEM — E78.5 HYPERLIPIDEMIA: Status: ACTIVE | Noted: 2021-08-02

## 2021-08-02 PROBLEM — E11.9 DIABETES: Status: ACTIVE | Noted: 2021-08-02

## 2021-08-02 PROBLEM — I10 HYPERTENSION: Status: ACTIVE | Noted: 2021-08-02

## 2021-08-02 PROBLEM — N28.9 KIDNEY DISEASE: Status: ACTIVE | Noted: 2021-08-02

## 2021-08-02 PROCEDURE — 99214 OFFICE O/P EST MOD 30 MIN: CPT | Performed by: NURSE PRACTITIONER

## 2021-08-02 RX ORDER — SULFAMETHOXAZOLE AND TRIMETHOPRIM 800; 160 MG/1; MG/1
TABLET ORAL
COMMUNITY
Start: 2021-05-25 | End: 2021-11-02

## 2021-08-02 RX ORDER — METFORMIN HYDROCHLORIDE 500 MG/1
1000 TABLET, EXTENDED RELEASE ORAL DAILY
COMMUNITY
Start: 2021-05-20

## 2021-08-02 RX ORDER — PRIMIDONE 250 MG/1
250 TABLET ORAL NIGHTLY
COMMUNITY
Start: 2021-04-20

## 2021-08-02 RX ORDER — PANCRELIPASE 36000; 180000; 114000 [USP'U]/1; [USP'U]/1; [USP'U]/1
72000 CAPSULE, DELAYED RELEASE PELLETS ORAL 4 TIMES DAILY
Qty: 720 CAPSULE | Refills: 1 | Status: SHIPPED | OUTPATIENT
Start: 2021-08-02 | End: 2021-08-03 | Stop reason: SDUPTHER

## 2021-08-02 RX ORDER — LISINOPRIL 5 MG/1
5 TABLET ORAL DAILY
COMMUNITY
Start: 2021-05-20

## 2021-08-02 RX ORDER — PANCRELIPASE 15000; 3000; 9500 [USP'U]/1; [USP'U]/1; [USP'U]/1
3000 CAPSULE, DELAYED RELEASE ORAL
Qty: 36 CAPSULE | Refills: 0 | COMMUNITY
Start: 2021-08-02 | End: 2021-08-03 | Stop reason: SDUPTHER

## 2021-08-02 RX ORDER — TOPIRAMATE 100 MG/1
100 TABLET, FILM COATED ORAL 2 TIMES DAILY
COMMUNITY

## 2021-08-02 RX ORDER — PANCRELIPASE 24000; 76000; 120000 [USP'U]/1; [USP'U]/1; [USP'U]/1
CAPSULE, DELAYED RELEASE PELLETS ORAL
COMMUNITY
Start: 2021-05-20 | End: 2021-08-02

## 2021-08-02 RX ORDER — SODIUM BICARBONATE 325 MG/1
325 TABLET ORAL DAILY
COMMUNITY

## 2021-08-02 RX ORDER — GLIMEPIRIDE 2 MG/1
TABLET ORAL
COMMUNITY
Start: 2021-05-20

## 2021-08-02 RX ORDER — LOVASTATIN 20 MG/1
20 TABLET ORAL NIGHTLY
COMMUNITY
Start: 2021-05-20

## 2021-08-02 RX ORDER — PHENAZOPYRIDINE HYDROCHLORIDE 200 MG/1
TABLET, FILM COATED ORAL
COMMUNITY
Start: 2021-04-27 | End: 2022-03-30

## 2021-08-02 RX ORDER — CIPROFLOXACIN 500 MG/1
TABLET, FILM COATED ORAL
COMMUNITY
Start: 2021-04-27 | End: 2021-11-02

## 2021-08-02 NOTE — PATIENT INSTRUCTIONS
Chronic Pancreatitis    Chronic pancreatitis is long-lasting inflammation and scarring of the pancreas. The pancreas is a gland that is located behind the stomach. It makes enzymes that help to digest food. The pancreas also releases hormones called glucagon and insulin, which help regulate blood sugar (glucose). Damage to the pancreas may affect digestion, cause pain in the upper abdomen and back, and cause diabetes. Inflammation can also irritate other organs in the abdomen near the pancreas.  At first, pancreatitis may be sudden (acute). If you have several or prolonged episodes of acute pancreatitis, the condition can turn into chronic pancreatitis.  What are the causes?  The most common cause of this condition is alcohol abuse. Other causes include:  · High (elevated) levels of triglycerides in the blood (hypertriglyceridemia).  · Gallstones or other conditions that can block the tube that drains the pancreas (pancreatic duct).  · Pancreatic cancer.  · Cystic fibrosis.  · Too much calcium in the blood (hypercalcemia), which may be caused by an overactive parathyroid gland (hyperparathyroidism).  · Certain medicines.  · Injury to the pancreas.  · Infection.  · Autoimmune pancreatitis. This is when the body's disease-fighting (immune) system attacks the pancreas.  · Genes that are passed from parent to child (inherited).  In some cases, the cause may not be known.  What increases the risk?  This condition is more likely to develop in:  · Men.  · People who are 35-55 years old.  · People who have a family history of pancreatitis.  · People who smoke tobacco.  · People who drink large amounts of alcohol over a long period of time.  What are the signs or symptoms?  Symptoms of this condition may include:  · Pain in the abdomen or upper back. Pain may get worse after eating.  · Nausea and vomiting.  · Fever.  · Weight loss.  · A change in the color and consistency of bowel movements, such as stools that are oily,  fatty, or rohan-colored.  How is this diagnosed?  This condition is diagnosed based on your symptoms, your medical history, and a physical exam. You may have tests, such as:  · Blood tests.  · Stool samples.  · Biopsy of the pancreas. This is the removal of a small amount of pancreas tissue to be tested in a lab.  · Imaging tests, such as:  ? X-rays.  ? CT scan.  ? MRI.  ? Ultrasound.  How is this treated?  You may need to be treated at a hospital. Treatment may involve:  · Resting the pancreas. You may need to stop eating and drinking for a few days to give your pancreas time to recover. During this time, you will be given IV fluids to keep you hydrated.  · Controlling pain. You may be given pain medicines by mouth (orally) or as injections.  · Improving digestion. You may be given:  ? Medicines to replace your pancreatic enzymes.  ? Vitamin supplements.  ? A specific diet to follow. You may work with a diet and nutrition specialist (dietitian) to make an eating plan.  · Surgery to:  ? Clear the pancreatic ducts of any blockages, such as gallstones.  ? Remove any fluid or damaged tissue from the pancreas.  Other treatments may include:  · Preventing diabetes. Your health care provider may recommend that you:  ? Get regular screening tests for diabetes.  ? Monitor your blood glucose regularly.  · Lifestyle changes, such as stopping alcohol use.  · Steroid medicines, if your condition is caused by your immune system attacking your body's own tissues (autoimmune disease).  Follow these instructions at home:  Eating and drinking         · Do not drink alcohol. If you need help quitting, ask your health care provider.  · Follow a diet as told by your health care provider or dietitian, if this applies. This may include:  ? Limiting how much fat you eat.  ? Eating smaller meals more often.  ? Avoiding caffeine.  · Drink enough fluid to keep your urine pale yellow.  General instructions  · Take over-the-counter and  prescription medicines only as told by your health care provider. These include vitamin supplements.  · Do not drive or use heavy machinery while taking prescription pain medicine.  · If you are taking prescription pain medicine, take actions to prevent or treat constipation. Your health care provider may recommend that you:  ? Take an over-the-counter or prescription medicine for constipation.  ? Eat foods that are high in fiber such as whole grains and beans.  ? Limit foods that are high in fat and processed sugars, such as fried or sweet foods.  · Do not use any products that contain nicotine or tobacco, such as cigarettes and e-cigarettes. If you need help quitting, ask your health care provider.  · If recommended by your health care provider, monitor your blood glucose at home.  · Keep all follow-up visits as told by your health care provider. This is important.  Contact a health care provider if:  · You have pain that does not get better with medicine.  · You have a fever.  · You have sudden weight loss.  Get help right away if:  · Your pain suddenly gets worse.  · You have sudden swelling in your abdomen.  · You start to vomit often.  · You vomit blood.  · You have diarrhea that does not go away.  · You have blood in your stool.  · You become confused or you have trouble thinking clearly.  Summary  · Chronic pancreatitis is long-lasting inflammation and scarring of the pancreas. Damage to the pancreas may affect digestion, cause pain in the upper abdomen and back, and cause diabetes. Inflammation can also irritate other organs in the abdomen near the pancreas.  · Common causes of this condition are alcohol abuse, gallstones, high (elevated) levels of triglycerides, and certain medicines.  · This condition is sometimes treated at a hospital and may involve resting the pancreas, controlling pain, replacing enzymes, and avoiding alcohol.  This information is not intended to replace advice given to you by your  health care provider. Make sure you discuss any questions you have with your health care provider.  Document Revised: 07/23/2020 Document Reviewed: 08/17/2018  [employer]vier Patient Education © 2021 [employer]

## 2021-08-02 NOTE — PROGRESS NOTES
Chief Complaint  Diarrhea    Salome Reeves is a 75 y.o. female who presents to [facility]STROENTER[facility] on referral from VERÓNICA Balbuena for a gastroenterology evaluation of diarrhea.  History of Present Illness  She reports diarrhea that began in January.  States that she underwent cholecystectomy in March secondary to cholelithiasis.  Reports that symptoms improved some following cholecystectomy.  Prior to cholecystectomy she experienced diarrhea following every meal and snack.  She was diagnosed with pancreatitis in 2009.  States that she has been on Creon 24,000 lipase units for many years.    Denies rectal bleeding.    She's unsure of timing of previous colonoscopy, but feels that it has been many years ago.  No colon polyps per patient recall.      Admits only occasional nausea.  Denies heartburn and dysphagia.              Past Medical History:   Diagnosis Date   • Arthritis    • Bladder disorder    • Diabetes (CMS/HCC)    • Hyperlipemia    • Hypertension    • Kidney disease    • Limb swelling    • Neurologic disorder    • Seasonal allergies    • Thyroid disorder        Past Surgical History:   Procedure Laterality Date   • APPENDECTOMY     • CATARACT EXTRACTION     • COLONOSCOPY  2011    [facility]   • HYSTERECTOMY     • PARATHYROIDECTOMY  2014   • UPPER GASTROINTESTINAL ENDOSCOPY  2011    [facility]         Current Outpatient Medications:   •  glimepiride (AMARYL) 2 MG tablet, glimepiride 2 mg oral tablet take 1 tablet (2 mg) by oral route once daily   Active, Disp: , Rfl:   •  metFORMIN ER (GLUCOPHAGE-XR) 500 MG 24 hr tablet, Take 1,000 mg by mouth Daily., Disp: , Rfl:   •  primidone (MYSOLINE) 250 MG tablet, primidone 250 mg oral tablet take 1 tablet (250 mg) by oral route 3 times per day for maintenance   Active, Disp: , Rfl:   •  Aspirin Buf,CaCarb-MgCarb-MgO, 81 MG tablet, Take 81 mg by mouth Daily., Disp: , Rfl:   •  ciprofloxacin (CIPRO) 500 MG tablet, , Disp: , Rfl:   •  lisinopril  "(PRINIVIL,ZESTRIL) 5 MG tablet, , Disp: , Rfl:   •  lovastatin (MEVACOR) 20 MG tablet, , Disp: , Rfl:   •  Pancrelipase, Lip-Prot-Amyl, (Creon) 7085-2576 units capsule delayed-release particles capsule, Take 1 capsule by mouth 3 (Three) Times a Day With Meals., Disp: 36 capsule, Rfl: 0  •  Pancrelipase, Lip-Prot-Amyl, (Creon) 51690-820935 units capsule delayed-release particles capsule, Take 2 capsules by mouth 4 (Four) Times a Day for 90 days. 2 with meals, 1 with snacks, Disp: 720 capsule, Rfl: 1  •  phenazopyridine (PYRIDIUM) 200 MG tablet, , Disp: , Rfl:   •  sodium bicarbonate 325 MG tablet, Take 325 mg by mouth., Disp: , Rfl:   •  sulfamethoxazole-trimethoprim (BACTRIM DS,SEPTRA DS) 800-160 MG per tablet, , Disp: , Rfl:   •  topiramate (TOPAMAX) 100 MG tablet, Take 100 mg by mouth., Disp: , Rfl:      Allergies   Allergen Reactions   • Adhesive Tape Other (See Comments)     BLISTERS,SKIN BURN; \"PAPER TAPE IS FINE\"     • Amoxicillin-Pot Clavulanate Nausea Only     NAUSEA AND SEVERE DIARRHEA     • Iodine Rash     BLISTERS       Family History   Problem Relation Age of Onset   • Cancer Mother    • Cancer Father         Social History     Social History Narrative   • Not on file       Immunization:    There is no immunization history on file for this patient.     Objective     Review of Systems   Constitutional: Negative for fever and unexpected weight loss.   Respiratory: Negative for cough and shortness of breath.    Cardiovascular: Negative for chest pain and palpitations.   Gastrointestinal:        See HPI   Neurological: Negative for weakness and confusion.        Vital Signs:   /72 (BP Location: Left arm, Patient Position: Sitting, Cuff Size: Large Adult)   Pulse 88   Ht 160 cm (63\")   Wt 71.6 kg (157 lb 12.8 oz)   BMI 27.95 kg/m²       Physical Exam  Constitutional:       General: She is not in acute distress.     Appearance: Normal appearance. She is well-developed and normal weight.   HENT:      " Head: Normocephalic and atraumatic.   Eyes:      Conjunctiva/sclera: Conjunctivae normal.      Pupils: Pupils are equal, round, and reactive to light.      Visual Fields: Right eye visual fields normal and left eye visual fields normal.   Cardiovascular:      Rate and Rhythm: Normal rate and regular rhythm.      Heart sounds: Normal heart sounds.   Pulmonary:      Effort: Pulmonary effort is normal. No retractions.      Breath sounds: Normal breath sounds and air entry.   Abdominal:      General: Bowel sounds are normal.      Palpations: Abdomen is soft.      Tenderness: There is no abdominal tenderness.      Comments: No appreciable hepatosplenomegaly or ascites   Musculoskeletal:         General: Normal range of motion.      Cervical back: Neck supple.      Right lower leg: No edema.      Left lower leg: No edema.   Lymphadenopathy:      Cervical: No cervical adenopathy.   Skin:     General: Skin is warm and dry.      Findings: No lesion.   Neurological:      General: No focal deficit present.      Mental Status: She is alert and oriented to person, place, and time.   Psychiatric:         Mood and Affect: Mood and affect normal.         Behavior: Behavior normal.         Result Review :   The following data was reviewed by: VERÓNICA Srinivasan on 08/02/2021:  CBC w/diff    CBC w/Diff 2/19/21 3/2/21 4/26/21   WBC 6.53 5.52 5.91   RBC 4.27 4.28 4.16 (A)   Hemoglobin 14.1 13.9 13.5   Hematocrit 44.7 42.5 41.0   .7 (A) 99.3 98.6   MCH 33.0 (A) 32.5 32.5 (A)   MCHC 31.5 (A) 32.7 32.9 (A)   RDW 12.7 13.2 13.2   Platelets 211 279 204   Neutrophil Rel % 68.9 64.3 48.6   Immature Granulocyte Rel %  0.4    Lymphocyte Rel % 22.5 26.3 32.7   Monocyte Rel % 6.3 6.9 6.8   Eosinophil Rel % 1.5 1.6 11.0 (A)   Basophil Rel % 0.5 0.5 0.7   (A) Abnormal value          CT Abdomen Pelvis With Contrast (02/19/2021 14:09)   CONCLUSION:     1. No acute abnormality in the abdomen or pelvis.    2. Colonic diverticulosis  without diverticulitis.    3. Cholelithiasis.    4. Sequela of chronic pancreatitis.    5. Additional chronic findings above.                   Assessment and Plan    Diagnoses and all orders for this visit:    1. Chronic pancreatitis, unspecified pancreatitis type (CMS/HCC) (Primary)  -     Pancrelipase, Lip-Prot-Amyl, (Creon) 06969-669341 units capsule delayed-release particles capsule; Take 2 capsules by mouth 4 (Four) Times a Day for 90 days. 2 with meals, 1 with snacks  Dispense: 720 capsule; Refill: 1    2. Diarrhea, unspecified type    Other orders  -     Pancrelipase, Lip-Prot-Amyl, (Creon) 6694-4982 units capsule delayed-release particles capsule; Take 1 capsule by mouth 3 (Three) Times a Day With Meals.  Dispense: 36 capsule; Refill: 0    Discussed with patient that due to timing of symptoms, diarrhea is likely related to chronic pancreatitis and suboptimal dosing of Creon.  Reviewed increased dosing amount with patient and timing of dosing reviewed.  We will try increasing Creon (new Rx sent).  If no improvement in symptoms, recommend colonoscopy for further work-up.  Discussed with patient need for colonoscopy for colon cancer screening.  She would like to think about this.        Follow Up   Return in about 3 months (around 11/2/2021) for Diarrhea.  Patient was given instructions and counseling regarding her condition or for health maintenance advice. Please see specific information pulled into the AVS if appropriate.

## 2021-08-03 RX ORDER — PANCRELIPASE 36000; 180000; 114000 [USP'U]/1; [USP'U]/1; [USP'U]/1
108000 CAPSULE, DELAYED RELEASE PELLETS ORAL 4 TIMES DAILY
Qty: 720 CAPSULE | Refills: 1 | Status: SHIPPED | OUTPATIENT
Start: 2021-08-03 | End: 2021-11-02 | Stop reason: SDUPTHER

## 2021-09-30 ENCOUNTER — TRANSCRIBE ORDERS (OUTPATIENT)
Dept: ADMINISTRATIVE | Facility: HOSPITAL | Age: 76
End: 2021-09-30

## 2021-09-30 DIAGNOSIS — Z12.31 VISIT FOR SCREENING MAMMOGRAM: Primary | ICD-10-CM

## 2021-10-19 ENCOUNTER — TRANSCRIBE ORDERS (OUTPATIENT)
Dept: LAB | Facility: HOSPITAL | Age: 76
End: 2021-10-19

## 2021-10-19 ENCOUNTER — LAB (OUTPATIENT)
Dept: LAB | Facility: HOSPITAL | Age: 76
End: 2021-10-19

## 2021-10-19 DIAGNOSIS — N18.30 STAGE 3 CHRONIC KIDNEY DISEASE, UNSPECIFIED WHETHER STAGE 3A OR 3B CKD (HCC): Primary | ICD-10-CM

## 2021-10-19 DIAGNOSIS — N18.30 STAGE 3 CHRONIC KIDNEY DISEASE, UNSPECIFIED WHETHER STAGE 3A OR 3B CKD (HCC): ICD-10-CM

## 2021-10-19 LAB
ALBUMIN SERPL-MCNC: 4.4 G/DL (ref 3.5–5.2)
ANION GAP SERPL CALCULATED.3IONS-SCNC: 14.6 MMOL/L (ref 5–15)
BILIRUB UR QL STRIP: NEGATIVE
BUN SERPL-MCNC: 32 MG/DL (ref 8–23)
BUN/CREAT SERPL: 25.4 (ref 7–25)
CALCIUM SPEC-SCNC: 9.7 MG/DL (ref 8.6–10.5)
CHLORIDE SERPL-SCNC: 107 MMOL/L (ref 98–107)
CLARITY UR: CLEAR
CO2 SERPL-SCNC: 21.4 MMOL/L (ref 22–29)
COLOR UR: ABNORMAL
CREAT SERPL-MCNC: 1.26 MG/DL (ref 0.57–1)
CREAT UR-MCNC: 44.9 MG/DL
DEPRECATED RDW RBC AUTO: 46 FL (ref 37–54)
ERYTHROCYTE [DISTWIDTH] IN BLOOD BY AUTOMATED COUNT: 12.5 % (ref 12.3–15.4)
GFR SERPL CREATININE-BSD FRML MDRD: 42 ML/MIN/1.73
GLUCOSE SERPL-MCNC: 120 MG/DL (ref 65–99)
GLUCOSE UR STRIP-MCNC: NEGATIVE MG/DL
HCT VFR BLD AUTO: 38.7 % (ref 34–46.6)
HGB BLD-MCNC: 13 G/DL (ref 12–15.9)
HGB UR QL STRIP.AUTO: NEGATIVE
KETONES UR QL STRIP: NEGATIVE
LEUKOCYTE ESTERASE UR QL STRIP.AUTO: ABNORMAL
MCH RBC QN AUTO: 33.7 PG (ref 26.6–33)
MCHC RBC AUTO-ENTMCNC: 33.6 G/DL (ref 31.5–35.7)
MCV RBC AUTO: 100.3 FL (ref 79–97)
NITRITE UR QL STRIP: POSITIVE
PH UR STRIP.AUTO: 8.5 [PH] (ref 5–8)
PHOSPHATE SERPL-MCNC: 3.5 MG/DL (ref 2.5–4.5)
PLATELET # BLD AUTO: 236 10*3/MM3 (ref 140–450)
PMV BLD AUTO: 10.2 FL (ref 6–12)
POTASSIUM SERPL-SCNC: 4.4 MMOL/L (ref 3.5–5.2)
PROT UR QL STRIP: NEGATIVE
PROT UR-MCNC: 7.5 MG/DL
PROT/CREAT UR: 0.2 MG/G{CREAT}
RBC # BLD AUTO: 3.86 10*6/MM3 (ref 3.77–5.28)
SODIUM SERPL-SCNC: 143 MMOL/L (ref 136–145)
SP GR UR STRIP: 1.01 (ref 1–1.03)
UROBILINOGEN UR QL STRIP: ABNORMAL
WBC # BLD AUTO: 6.33 10*3/MM3 (ref 3.4–10.8)

## 2021-10-19 PROCEDURE — 81001 URINALYSIS AUTO W/SCOPE: CPT

## 2021-10-19 PROCEDURE — 84156 ASSAY OF PROTEIN URINE: CPT

## 2021-10-19 PROCEDURE — 82570 ASSAY OF URINE CREATININE: CPT

## 2021-10-19 PROCEDURE — 36415 COLL VENOUS BLD VENIPUNCTURE: CPT

## 2021-10-19 PROCEDURE — 80069 RENAL FUNCTION PANEL: CPT

## 2021-10-19 PROCEDURE — 85027 COMPLETE CBC AUTOMATED: CPT

## 2021-10-20 LAB
BACTERIA UR QL AUTO: ABNORMAL /HPF
HYALINE CASTS UR QL AUTO: ABNORMAL /LPF
RBC # UR: ABNORMAL /HPF
REF LAB TEST METHOD: ABNORMAL
SQUAMOUS #/AREA URNS HPF: ABNORMAL /HPF
WBC UR QL AUTO: ABNORMAL /HPF

## 2021-11-02 ENCOUNTER — OFFICE VISIT (OUTPATIENT)
Dept: GASTROENTEROLOGY | Facility: CLINIC | Age: 76
End: 2021-11-02

## 2021-11-02 VITALS
DIASTOLIC BLOOD PRESSURE: 83 MMHG | HEIGHT: 63 IN | HEART RATE: 95 BPM | BODY MASS INDEX: 29.52 KG/M2 | WEIGHT: 166.6 LBS | SYSTOLIC BLOOD PRESSURE: 115 MMHG

## 2021-11-02 DIAGNOSIS — Z12.11 ENCOUNTER FOR SCREENING FOR MALIGNANT NEOPLASM OF COLON: ICD-10-CM

## 2021-11-02 DIAGNOSIS — K86.1 OTHER CHRONIC PANCREATITIS (HCC): Primary | ICD-10-CM

## 2021-11-02 DIAGNOSIS — K86.1 CHRONIC PANCREATITIS, UNSPECIFIED PANCREATITIS TYPE (HCC): ICD-10-CM

## 2021-11-02 PROCEDURE — 99214 OFFICE O/P EST MOD 30 MIN: CPT | Performed by: NURSE PRACTITIONER

## 2021-11-02 RX ORDER — PANCRELIPASE 36000; 180000; 114000 [USP'U]/1; [USP'U]/1; [USP'U]/1
108000 CAPSULE, DELAYED RELEASE PELLETS ORAL 4 TIMES DAILY
Qty: 720 CAPSULE | Refills: 1 | Status: SHIPPED | OUTPATIENT
Start: 2021-11-02 | End: 2021-11-02

## 2021-11-02 RX ORDER — PANCRELIPASE 36000; 180000; 114000 [USP'U]/1; [USP'U]/1; [USP'U]/1
108000 CAPSULE, DELAYED RELEASE PELLETS ORAL 4 TIMES DAILY
Qty: 720 CAPSULE | Refills: 1 | Status: SHIPPED | OUTPATIENT
Start: 2021-11-02 | End: 2021-11-16 | Stop reason: SDUPTHER

## 2021-11-02 NOTE — PROGRESS NOTES
Chief Complaint  Diarrhea (follow up)    Salome Reeves is a 75 y.o. female who presents to [facility]OENTEROLOG[facility] for follow-up of chronic pancreatitis and diarrhea.    History of present Illness    She feels that increased dose of creon is working better for her.  She's still noticing diarrhea if she eats high fat foods.  She has learned what foods she is unable to eat.      History of Present Illness    Past Medical History:   Diagnosis Date   • Arthritis    • Bladder disorder    • Diabetes (HCC)    • Hyperlipemia    • Hypertension    • Kidney disease    • Limb swelling    • Neurologic disorder    • Seasonal allergies    • Thyroid disorder        Past Surgical History:   Procedure Laterality Date   • APPENDECTOMY     • CATARACT EXTRACTION     • COLONOSCOPY  2011    [facility]   • HYSTERECTOMY     • PARATHYROIDECTOMY  2014   • UPPER GASTROINTESTINAL ENDOSCOPY  2011    [facility]         Current Outpatient Medications:   •  Aspirin Buf,CaCarb-MgCarb-MgO, 81 MG tablet, Take 81 mg by mouth Daily., Disp: , Rfl:   •  glimepiride (AMARYL) 2 MG tablet, glimepiride 2 mg oral tablet take 1 tablet (2 mg) by oral route once daily   Active, Disp: , Rfl:   •  lisinopril (PRINIVIL,ZESTRIL) 5 MG tablet, , Disp: , Rfl:   •  lovastatin (MEVACOR) 20 MG tablet, , Disp: , Rfl:   •  metFORMIN ER (GLUCOPHAGE-XR) 500 MG 24 hr tablet, Take 1,000 mg by mouth Daily., Disp: , Rfl:   •  phenazopyridine (PYRIDIUM) 200 MG tablet, , Disp: , Rfl:   •  primidone (MYSOLINE) 250 MG tablet, primidone 250 mg oral tablet take 1 tablet (250 mg) by oral route 3 times per day for maintenance   Active, Disp: , Rfl:   •  sodium bicarbonate 325 MG tablet, Take 325 mg by mouth., Disp: , Rfl:   •  topiramate (TOPAMAX) 100 MG tablet, Take 100 mg by mouth., Disp: , Rfl:   •  Pancrelipase, Lip-Prot-Amyl, (Creon) 05044-627691 units capsule delayed-release particles capsule, Take 3 capsules by mouth 4 (Four) Times a Day for 90 days. 2 with meals, 1  "with snacks, Disp: 720 capsule, Rfl: 1     Allergies   Allergen Reactions   • Adhesive Tape Other (See Comments)     BLISTERS,SKIN BURN; \"PAPER TAPE IS FINE\"     • Amoxicillin-Pot Clavulanate Nausea Only     NAUSEA AND SEVERE DIARRHEA     • Iodine Rash     BLISTERS       Family History   Problem Relation Age of Onset   • Cancer Mother    • Cancer Father         Social History     Social History Narrative   • Not on file       Objective     Review of Systems   Constitutional: Negative for fever and unexpected weight loss.   Respiratory: Negative for cough and shortness of breath.    Cardiovascular: Negative for chest pain and palpitations.   Gastrointestinal:        See HPI   Neurological: Negative for weakness and confusion.        Vital Signs:   /83 (BP Location: Left arm, Patient Position: Sitting, Cuff Size: Adult)   Pulse 95   Ht 160 cm (63\")   Wt 75.6 kg (166 lb 9.6 oz)   BMI 29.51 kg/m²       Physical Exam  Constitutional:       General: She is not in acute distress.     Appearance: Normal appearance. She is well-developed and normal weight.   HENT:      Head: Normocephalic and atraumatic.   Eyes:      Conjunctiva/sclera: Conjunctivae normal.      Pupils: Pupils are equal, round, and reactive to light.      Visual Fields: Right eye visual fields normal and left eye visual fields normal.   Cardiovascular:      Rate and Rhythm: Normal rate and regular rhythm.      Heart sounds: Normal heart sounds.   Pulmonary:      Effort: Pulmonary effort is normal. No retractions.      Breath sounds: Normal breath sounds and air entry.   Abdominal:      General: Bowel sounds are normal.      Palpations: Abdomen is soft.      Tenderness: There is no abdominal tenderness.      Comments: No appreciable hepatosplenomegaly or ascites   Musculoskeletal:         General: Normal range of motion.      Cervical back: Neck supple.      Right lower leg: No edema.      Left lower leg: No edema.   Lymphadenopathy:      Cervical: " No cervical adenopathy.   Skin:     General: Skin is warm and dry.      Findings: No lesion.   Neurological:      General: No focal deficit present.      Mental Status: She is alert and oriented to person, place, and time.   Psychiatric:         Mood and Affect: Mood and affect normal.         Behavior: Behavior normal.         Result Review :                   Assessment and Plan    Diagnoses and all orders for this visit:    1. Other chronic pancreatitis (HCC) (Primary)    2. Chronic pancreatitis, unspecified pancreatitis type (HCC)  -     Discontinue: Pancrelipase, Lip-Prot-Amyl, (Creon) 10300-881274 units capsule delayed-release particles capsule; Take 3 capsules by mouth 4 (Four) Times a Day for 90 days. 2 with meals, 1 with snacks  Dispense: 720 capsule; Refill: 1  -     Pancrelipase, Lip-Prot-Amyl, (Creon) 58071-936408 units capsule delayed-release particles capsule; Take 3 capsules by mouth 4 (Four) Times a Day for 90 days. 2 with meals, 1 with snacks  Dispense: 720 capsule; Refill: 1    3. Encounter for screening for malignant neoplasm of colon  -     Cologuard - Stool, Per Rectum        Continue creon as symptoms are improved.          Follow Up   Return in about 6 months (around 5/2/2022) for pancreatitis .  Patient was given instructions and counseling regarding her condition or for health maintenance advice. Please see specific information pulled into the AVS if appropriate.

## 2021-11-15 NOTE — TELEPHONE ENCOUNTER
Ms. Reeves stopped by the office today with a letter from her insurance. Her insurance will now only cover 630tablets/90days supply. She states that this has dropped her creon dosing from 3 with meal and 2 with snacks to 2 with meals and 1 with snacks. She states that she is unable to tolerate this decrease and the diarrhea will return. Please advise.

## 2021-11-15 NOTE — TELEPHONE ENCOUNTER
Ms. Reeves called back later today stating that [facility] told her the prescription was written for the lower amount. I reviewed the prescription sent on 11/4/21 and it was written for 3 tablets QID but the directions say take 2 tablets with a meal and one with snack. It looks like the prescription is wrong entirely as she states again that her dosing is 3 with each meal and 2 with each snack.

## 2021-11-15 NOTE — TELEPHONE ENCOUNTER
Im so confused.. Per juarez  note it says for patient to take 3 tablets 4x a day. Is this what I need to send to [facility]?

## 2021-11-16 DIAGNOSIS — K86.1 CHRONIC PANCREATITIS, UNSPECIFIED PANCREATITIS TYPE (HCC): ICD-10-CM

## 2021-11-16 RX ORDER — PANCRELIPASE 36000; 180000; 114000 [USP'U]/1; [USP'U]/1; [USP'U]/1
108000 CAPSULE, DELAYED RELEASE PELLETS ORAL
Qty: 450 CAPSULE | Refills: 5 | Status: SHIPPED | OUTPATIENT
Start: 2021-11-16 | End: 2022-02-14

## 2021-11-16 RX ORDER — PANCRELIPASE 36000; 180000; 114000 [USP'U]/1; [USP'U]/1; [USP'U]/1
108000 CAPSULE, DELAYED RELEASE PELLETS ORAL
Qty: 450 CAPSULE | Refills: 5 | Status: SHIPPED | OUTPATIENT
Start: 2021-11-16 | End: 2021-11-16 | Stop reason: SDUPTHER

## 2021-12-23 ENCOUNTER — HOSPITAL ENCOUNTER (OUTPATIENT)
Dept: MAMMOGRAPHY | Facility: HOSPITAL | Age: 76
Discharge: HOME | End: 2021-12-23
Admitting: NURSE PRACTITIONER

## 2021-12-23 DIAGNOSIS — Z12.31 VISIT FOR SCREENING MAMMOGRAM: ICD-10-CM

## 2021-12-23 PROCEDURE — 77067 SCR MAMMO BI INCL CAD: CPT

## 2021-12-23 PROCEDURE — 77063 BREAST TOMOSYNTHESIS BI: CPT

## 2022-01-21 ENCOUNTER — TELEPHONE (OUTPATIENT)
Dept: GASTROENTEROLOGY | Facility: CLINIC | Age: 77
End: 2022-01-21

## 2022-01-21 NOTE — TELEPHONE ENCOUNTER
Left message with patient asking for a returned call to follow up on overdue results for cologuard.  Will postpone orders and follow up.

## 2022-03-04 ENCOUNTER — PREP FOR SURGERY (OUTPATIENT)
Dept: OTHER | Facility: HOSPITAL | Age: 77
End: 2022-03-04

## 2022-03-04 DIAGNOSIS — R19.7 DIARRHEA, UNSPECIFIED TYPE: ICD-10-CM

## 2022-03-04 DIAGNOSIS — R10.10 PAIN OF UPPER ABDOMEN: Primary | ICD-10-CM

## 2022-03-04 RX ORDER — SODIUM, POTASSIUM,MAG SULFATES 17.5-3.13G
1 SOLUTION, RECONSTITUTED, ORAL ORAL EVERY 12 HOURS
Qty: 354 ML | Refills: 0 | Status: ON HOLD | OUTPATIENT
Start: 2022-03-04 | End: 2022-03-31

## 2022-03-14 ENCOUNTER — LAB (OUTPATIENT)
Dept: LAB | Facility: HOSPITAL | Age: 77
End: 2022-03-14

## 2022-03-14 ENCOUNTER — TRANSCRIBE ORDERS (OUTPATIENT)
Dept: LAB | Facility: HOSPITAL | Age: 77
End: 2022-03-14

## 2022-03-14 DIAGNOSIS — N18.30 STAGE 3 CHRONIC KIDNEY DISEASE, UNSPECIFIED WHETHER STAGE 3A OR 3B CKD: ICD-10-CM

## 2022-03-14 DIAGNOSIS — N18.30 STAGE 3 CHRONIC KIDNEY DISEASE, UNSPECIFIED WHETHER STAGE 3A OR 3B CKD: Primary | ICD-10-CM

## 2022-03-14 LAB
25(OH)D3 SERPL-MCNC: 61.7 NG/ML (ref 30–100)
ALBUMIN SERPL-MCNC: 4.4 G/DL (ref 3.5–5.2)
ANION GAP SERPL CALCULATED.3IONS-SCNC: 15 MMOL/L (ref 5–15)
BACTERIA UR QL AUTO: ABNORMAL /HPF
BASOPHILS # BLD AUTO: 0.03 10*3/MM3 (ref 0–0.2)
BASOPHILS NFR BLD AUTO: 0.4 % (ref 0–1.5)
BILIRUB UR QL STRIP: NEGATIVE
BUN SERPL-MCNC: 21 MG/DL (ref 8–23)
BUN/CREAT SERPL: 19.6 (ref 7–25)
CALCIUM SPEC-SCNC: 9.2 MG/DL (ref 8.6–10.5)
CHLORIDE SERPL-SCNC: 101 MMOL/L (ref 98–107)
CLARITY UR: CLEAR
CO2 SERPL-SCNC: 22 MMOL/L (ref 22–29)
COLOR UR: ABNORMAL
CREAT SERPL-MCNC: 1.07 MG/DL (ref 0.57–1)
CREAT UR-MCNC: 72.5 MG/DL
DEPRECATED RDW RBC AUTO: 45.2 FL (ref 37–54)
EGFRCR SERPLBLD CKD-EPI 2021: 54.3 ML/MIN/1.73
EOSINOPHIL # BLD AUTO: 0.31 10*3/MM3 (ref 0–0.4)
EOSINOPHIL NFR BLD AUTO: 4.3 % (ref 0.3–6.2)
ERYTHROCYTE [DISTWIDTH] IN BLOOD BY AUTOMATED COUNT: 12.4 % (ref 12.3–15.4)
GLUCOSE SERPL-MCNC: 87 MG/DL (ref 65–99)
GLUCOSE UR STRIP-MCNC: NEGATIVE MG/DL
HCT VFR BLD AUTO: 38.3 % (ref 34–46.6)
HGB BLD-MCNC: 12.8 G/DL (ref 12–15.9)
HGB UR QL STRIP.AUTO: NEGATIVE
HYALINE CASTS UR QL AUTO: ABNORMAL /LPF
IMM GRANULOCYTES # BLD AUTO: 0.03 10*3/MM3 (ref 0–0.05)
IMM GRANULOCYTES NFR BLD AUTO: 0.4 % (ref 0–0.5)
KETONES UR QL STRIP: NEGATIVE
LEUKOCYTE ESTERASE UR QL STRIP.AUTO: ABNORMAL
LYMPHOCYTES # BLD AUTO: 1.31 10*3/MM3 (ref 0.7–3.1)
LYMPHOCYTES NFR BLD AUTO: 18.2 % (ref 19.6–45.3)
MCH RBC QN AUTO: 33.8 PG (ref 26.6–33)
MCHC RBC AUTO-ENTMCNC: 33.4 G/DL (ref 31.5–35.7)
MCV RBC AUTO: 101.1 FL (ref 79–97)
MONOCYTES # BLD AUTO: 0.51 10*3/MM3 (ref 0.1–0.9)
MONOCYTES NFR BLD AUTO: 7.1 % (ref 5–12)
NEUTROPHILS NFR BLD AUTO: 5.02 10*3/MM3 (ref 1.7–7)
NEUTROPHILS NFR BLD AUTO: 69.6 % (ref 42.7–76)
NITRITE UR QL STRIP: NEGATIVE
NRBC BLD AUTO-RTO: 0 /100 WBC (ref 0–0.2)
PH UR STRIP.AUTO: 6 [PH] (ref 5–8)
PHOSPHATE SERPL-MCNC: 3.3 MG/DL (ref 2.5–4.5)
PLATELET # BLD AUTO: 276 10*3/MM3 (ref 140–450)
PMV BLD AUTO: 10 FL (ref 6–12)
POTASSIUM SERPL-SCNC: 3.7 MMOL/L (ref 3.5–5.2)
PROT ?TM UR-MCNC: 42.5 MG/DL
PROT UR QL STRIP: ABNORMAL
PROT/CREAT UR: 0.59 MG/G{CREAT}
PTH-INTACT SERPL-MCNC: 27.6 PG/ML (ref 15–65)
RBC # BLD AUTO: 3.79 10*6/MM3 (ref 3.77–5.28)
RBC # UR STRIP: ABNORMAL /HPF
REF LAB TEST METHOD: ABNORMAL
SODIUM SERPL-SCNC: 138 MMOL/L (ref 136–145)
SP GR UR STRIP: 1.01 (ref 1–1.03)
SQUAMOUS #/AREA URNS HPF: ABNORMAL /HPF
UROBILINOGEN UR QL STRIP: ABNORMAL
WBC # UR STRIP: ABNORMAL /HPF
WBC NRBC COR # BLD: 7.21 10*3/MM3 (ref 3.4–10.8)

## 2022-03-14 PROCEDURE — 83970 ASSAY OF PARATHORMONE: CPT

## 2022-03-14 PROCEDURE — 81001 URINALYSIS AUTO W/SCOPE: CPT

## 2022-03-14 PROCEDURE — 80069 RENAL FUNCTION PANEL: CPT

## 2022-03-14 PROCEDURE — 82306 VITAMIN D 25 HYDROXY: CPT

## 2022-03-14 PROCEDURE — 36415 COLL VENOUS BLD VENIPUNCTURE: CPT

## 2022-03-14 PROCEDURE — 82570 ASSAY OF URINE CREATININE: CPT

## 2022-03-14 PROCEDURE — 85025 COMPLETE CBC W/AUTO DIFF WBC: CPT

## 2022-03-14 PROCEDURE — 84156 ASSAY OF PROTEIN URINE: CPT

## 2022-03-21 ENCOUNTER — TELEPHONE (OUTPATIENT)
Dept: GASTROENTEROLOGY | Facility: CLINIC | Age: 77
End: 2022-03-21

## 2022-03-21 DIAGNOSIS — R19.7 DIARRHEA, UNSPECIFIED TYPE: Primary | ICD-10-CM

## 2022-03-21 NOTE — TELEPHONE ENCOUNTER
Patient notified. Patient is agreeable to stool studies.   Patient states that she just finished keflex due to a uti caused by ecoli from diarrhea.

## 2022-03-21 NOTE — TELEPHONE ENCOUNTER
Can you move EGD/colonoscopy up to sometime in the next 1 - 2 weeks if her C.diff returns negative?  thanks

## 2022-03-21 NOTE — TELEPHONE ENCOUNTER
"Patient has called the office and was asking if there was a cancellation for procedures. Patient states that she has had horrible diarrhea and that \"everything she eats goes right through her.\" Patient states that she has tried pepto bismol and another anti diarrheal medication and nothing has worked. She states that she is becoming weaker and sometimes she struggles to walk down the hallway to the restroom. Patient states that she makes sure to drink plenty of fluids. Patient advised that if she becomes weaker or any symptoms of dehydration to go to the er for fluids. Patient would like to know if there is anything she can do to help in the meantime. Please advise.   "

## 2022-03-23 ENCOUNTER — LAB (OUTPATIENT)
Dept: LAB | Facility: HOSPITAL | Age: 77
End: 2022-03-23

## 2022-03-23 DIAGNOSIS — R19.7 DIARRHEA, UNSPECIFIED TYPE: ICD-10-CM

## 2022-03-23 LAB
027 TOXIN: NORMAL
C DIFF TOX GENS STL QL NAA+PROBE: NEGATIVE
HEMOCCULT STL QL IA: NEGATIVE
LACTOFERRIN STL QL LA: NEGATIVE

## 2022-03-23 PROCEDURE — 83630 LACTOFERRIN FECAL (QUAL): CPT

## 2022-03-23 PROCEDURE — 82274 ASSAY TEST FOR BLOOD FECAL: CPT

## 2022-03-23 PROCEDURE — 87506 IADNA-DNA/RNA PROBE TQ 6-11: CPT

## 2022-03-23 PROCEDURE — 87493 C DIFF AMPLIFIED PROBE: CPT

## 2022-03-23 PROCEDURE — 82653 EL-1 FECAL QUANTITATIVE: CPT

## 2022-03-24 LAB
C COLI+JEJ+UPSA DNA STL QL NAA+NON-PROBE: NOT DETECTED
CRYPTOSP DNA STL QL NAA+NON-PROBE: NOT DETECTED
E HISTOLYT DNA STL QL NAA+NON-PROBE: NOT DETECTED
EC STX1+STX2 GENES STL QL NAA+NON-PROBE: NOT DETECTED
G LAMBLIA DNA STL QL NAA+NON-PROBE: NOT DETECTED
S ENT+BONG DNA STL QL NAA+NON-PROBE: NOT DETECTED
SHIGELLA SP+EIEC IPAH ST NAA+NON-PROBE: NOT DETECTED

## 2022-03-28 LAB — ELASTASE PANC STL-MCNT: NORMAL UG/G

## 2022-03-29 ENCOUNTER — TELEPHONE (OUTPATIENT)
Dept: GASTROENTEROLOGY | Facility: CLINIC | Age: 77
End: 2022-03-29

## 2022-03-29 NOTE — TELEPHONE ENCOUNTER
----- Message from Little Mcgee MD sent at 3/29/2022 11:27 AM EDT -----  We can leave as scheduled.  thanks  ----- Message -----  From: Yola Esquivel MA  Sent: 3/29/2022  11:21 AM EDT  To: Little Mcgee MD    Patient is scheduled for 04/22. Please advise if I should move patient up to 04/15.    ----- Message -----  From: Carmen Wilks  Sent: 3/29/2022  11:01 AM EDT  To: Yola Esquivel MA    The soonest she is going to be able to do is 4/15.  ----- Message -----  From: Yola Esquivel MA  Sent: 3/29/2022  10:44 AM EDT  To: Carmen AVILA requested to move up to next 1-2 weeks if stool studies negative. Please advise on scheduling.     ----- Message -----  From: Marta Cooper APRN  Sent: 3/29/2022  10:32 AM EDT  To: Yola Esquivel MA    Stool studies are negative.  Please move her colonoscopy up per Dr. Mcgee's last note.

## 2022-03-29 NOTE — TELEPHONE ENCOUNTER
OK to add her on to 3/31.  If she thinks she dehydrated and cannot keep up with her fluid intake, then she should to to the ER.  Otherwise, OK to move her endoscopy appointment up to 3/31.

## 2022-03-29 NOTE — TELEPHONE ENCOUNTER
----- Message from Carmen Wilks sent at 3/29/2022 11:01 AM EDT -----  The soonest she is going to be able to do is 4/15.  ----- Message -----  From: Yola Esquivel MA  Sent: 3/29/2022  10:44 AM EDT  To: Carmen AVILA requested to move up to next 1-2 weeks if stool studies negative. Please advise on scheduling.     ----- Message -----  From: Marta Cooper APRN  Sent: 3/29/2022  10:32 AM EDT  To: Yola Esquivel MA    Stool studies are negative.  Please move her colonoscopy up per Dr. Mcgee's last note.

## 2022-03-29 NOTE — TELEPHONE ENCOUNTER
Patient states that she had a missed call from our office. Patient wanted to confirm no changes have been made to procedure.

## 2022-03-29 NOTE — TELEPHONE ENCOUNTER
Patient has been rescheduled for 03/31/2022 with an arrival time of 0700. Patient aware of date and time.   Patient aware to go to the er if she believes she is dehydrated.   Patient states she hasnt taken an aspirin in awhile.

## 2022-03-29 NOTE — TELEPHONE ENCOUNTER
"Patient aware of results.   Patient states that she \"cannot wait until 04/22\" She states that she has had diarrhea every day, and has gone 4x today. She states that she is getting weak and cannot continue like this and is asking if she should go to the hospital please advise?  "

## 2022-03-31 ENCOUNTER — HOSPITAL ENCOUNTER (OUTPATIENT)
Facility: HOSPITAL | Age: 77
Setting detail: HOSPITAL OUTPATIENT SURGERY
Discharge: HOME | End: 2022-03-31
Attending: INTERNAL MEDICINE | Admitting: INTERNAL MEDICINE

## 2022-03-31 ENCOUNTER — ANESTHESIA EVENT (OUTPATIENT)
Dept: GASTROENTEROLOGY | Facility: HOSPITAL | Age: 77
End: 2022-03-31

## 2022-03-31 ENCOUNTER — ANESTHESIA (OUTPATIENT)
Dept: GASTROENTEROLOGY | Facility: HOSPITAL | Age: 77
End: 2022-03-31

## 2022-03-31 VITALS
OXYGEN SATURATION: 99 % | BODY MASS INDEX: 24.53 KG/M2 | DIASTOLIC BLOOD PRESSURE: 68 MMHG | SYSTOLIC BLOOD PRESSURE: 118 MMHG | HEART RATE: 93 BPM | HEIGHT: 63 IN | TEMPERATURE: 97.2 F | RESPIRATION RATE: 17 BRPM | WEIGHT: 138.45 LBS

## 2022-03-31 DIAGNOSIS — R19.7 DIARRHEA, UNSPECIFIED TYPE: ICD-10-CM

## 2022-03-31 DIAGNOSIS — R10.10 PAIN OF UPPER ABDOMEN: ICD-10-CM

## 2022-03-31 LAB — GLUCOSE BLDC GLUCOMTR-MCNC: 152 MG/DL (ref 70–99)

## 2022-03-31 PROCEDURE — 88305 TISSUE EXAM BY PATHOLOGIST: CPT | Performed by: INTERNAL MEDICINE

## 2022-03-31 PROCEDURE — 43239 EGD BIOPSY SINGLE/MULTIPLE: CPT | Performed by: INTERNAL MEDICINE

## 2022-03-31 PROCEDURE — 82962 GLUCOSE BLOOD TEST: CPT

## 2022-03-31 PROCEDURE — 25010000002 PROPOFOL 10 MG/ML EMULSION: Performed by: NURSE ANESTHETIST, CERTIFIED REGISTERED

## 2022-03-31 PROCEDURE — 45380 COLONOSCOPY AND BIOPSY: CPT | Performed by: INTERNAL MEDICINE

## 2022-03-31 RX ORDER — PANTOPRAZOLE SODIUM 20 MG/1
20 TABLET, DELAYED RELEASE ORAL DAILY
Qty: 30 TABLET | Refills: 1 | Status: SHIPPED | OUTPATIENT
Start: 2022-03-31 | End: 2022-05-26

## 2022-03-31 RX ORDER — LIDOCAINE HYDROCHLORIDE 20 MG/ML
INJECTION, SOLUTION INFILTRATION; PERINEURAL AS NEEDED
Status: DISCONTINUED | OUTPATIENT
Start: 2022-03-31 | End: 2022-03-31 | Stop reason: SURG

## 2022-03-31 RX ORDER — PHENYLEPHRINE HCL IN 0.9% NACL 1 MG/10 ML
SYRINGE (ML) INTRAVENOUS AS NEEDED
Status: DISCONTINUED | OUTPATIENT
Start: 2022-03-31 | End: 2022-03-31 | Stop reason: SURG

## 2022-03-31 RX ORDER — PROPOFOL 10 MG/ML
VIAL (ML) INTRAVENOUS AS NEEDED
Status: DISCONTINUED | OUTPATIENT
Start: 2022-03-31 | End: 2022-03-31 | Stop reason: SURG

## 2022-03-31 RX ORDER — SODIUM CHLORIDE, SODIUM LACTATE, POTASSIUM CHLORIDE, CALCIUM CHLORIDE 600; 310; 30; 20 MG/100ML; MG/100ML; MG/100ML; MG/100ML
30 INJECTION, SOLUTION INTRAVENOUS CONTINUOUS
Status: DISCONTINUED | OUTPATIENT
Start: 2022-03-31 | End: 2022-03-31 | Stop reason: HOSPADM

## 2022-03-31 RX ORDER — SODIUM CHLORIDE, SODIUM LACTATE, POTASSIUM CHLORIDE, CALCIUM CHLORIDE 600; 310; 30; 20 MG/100ML; MG/100ML; MG/100ML; MG/100ML
INJECTION, SOLUTION INTRAVENOUS CONTINUOUS PRN
Status: DISCONTINUED | OUTPATIENT
Start: 2022-03-31 | End: 2022-03-31 | Stop reason: SURG

## 2022-03-31 RX ADMIN — Medication 100 MCG: at 12:24

## 2022-03-31 RX ADMIN — PROPOFOL 50 MG: 10 INJECTION, EMULSION INTRAVENOUS at 11:55

## 2022-03-31 RX ADMIN — SODIUM CHLORIDE, POTASSIUM CHLORIDE, SODIUM LACTATE AND CALCIUM CHLORIDE: 600; 310; 30; 20 INJECTION, SOLUTION INTRAVENOUS at 11:46

## 2022-03-31 RX ADMIN — LIDOCAINE HYDROCHLORIDE 100 MG: 20 INJECTION, SOLUTION INFILTRATION; PERINEURAL at 11:55

## 2022-03-31 RX ADMIN — Medication 100 MCG: at 12:09

## 2022-03-31 RX ADMIN — PROPOFOL 50 MG: 10 INJECTION, EMULSION INTRAVENOUS at 11:57

## 2022-03-31 RX ADMIN — PROPOFOL 150 MCG/KG/MIN: 10 INJECTION, EMULSION INTRAVENOUS at 11:55

## 2022-03-31 RX ADMIN — SODIUM CHLORIDE, POTASSIUM CHLORIDE, SODIUM LACTATE AND CALCIUM CHLORIDE 30 ML/HR: 600; 310; 30; 20 INJECTION, SOLUTION INTRAVENOUS at 10:48

## 2022-03-31 NOTE — ANESTHESIA PREPROCEDURE EVALUATION
Anesthesia Evaluation     Patient summary reviewed and Nursing notes reviewed   no history of anesthetic complications:  NPO Solid Status: > 8 hours  NPO Liquid Status: > 2 hours           Airway   Mallampati: II  TM distance: >3 FB  Neck ROM: full  No difficulty expected  Dental      Pulmonary - negative pulmonary ROS and normal exam    breath sounds clear to auscultation  Cardiovascular - normal exam  Exercise tolerance: poor (<4 METS)    Rhythm: regular  Rate: normal    (+) hypertension, hyperlipidemia,       Neuro/Psych  (+) tremors,    GI/Hepatic/Renal/Endo    (+)   renal disease, diabetes mellitus, thyroid problem     Musculoskeletal     Abdominal    Substance History      OB/GYN          Other   arthritis,                      Anesthesia Plan    ASA 2     general   total IV anesthesia    Anesthetic plan, all risks, benefits, and alternatives have been provided, discussed and informed consent has been obtained with: patient.        CODE STATUS:

## 2022-03-31 NOTE — ANESTHESIA POSTPROCEDURE EVALUATION
Patient: Salome Reeves    Procedure Summary     Date: 03/31/22 Room / Location: [facility] ENDOSCOPY 1 / [facility] ENDOSCOPY    Anesthesia Start: 1149 Anesthesia Stop: 1233    Procedures:       ESOPHAGOGASTRODUODENOSCOPY (N/A )      COLONOSCOPY (N/A ) Diagnosis:       Diarrhea, unspecified type      Pain of upper abdomen      (Diarrhea, unspecified type [R19.7])      (Pain of upper abdomen [R10.10])    Surgeons: Little Mcgee MD Provider: Jorge L Dela Cruz MD    Anesthesia Type: general ASA Status: 2          Anesthesia Type: general    Vitals  Vitals Value Taken Time   BP 94/49 03/31/22 1242   Temp 36.1 °C (97 °F) 03/31/22 1235   Pulse 94 03/31/22 1245   Resp 15 03/31/22 1235   SpO2 96 % 03/31/22 1245   Vitals shown include unvalidated device data.        Post Anesthesia Care and Evaluation    Patient location during evaluation: bedside  Patient participation: complete - patient participated  Level of consciousness: awake  Pain management: adequate  Airway patency: patent  Anesthetic complications: No anesthetic complications  PONV Status: none  Cardiovascular status: acceptable and stable  Respiratory status: acceptable  Hydration status: acceptable    Comments: An Anesthesiologist personally participated in the most demanding procedures (including induction and emergence if applicable) in the anesthesia plan, monitored the course of anesthesia administration at frequent intervals and remained physically present and available for immediate diagnosis and treatment of emergencies.

## 2022-04-04 LAB
CYTO UR: NORMAL
LAB AP CASE REPORT: NORMAL
LAB AP CLINICAL INFORMATION: NORMAL
PATH REPORT.FINAL DX SPEC: NORMAL
PATH REPORT.GROSS SPEC: NORMAL

## 2022-04-06 ENCOUNTER — TELEPHONE (OUTPATIENT)
Dept: GASTROENTEROLOGY | Facility: CLINIC | Age: 77
End: 2022-04-06

## 2022-04-06 DIAGNOSIS — K52.831 COLLAGENOUS COLITIS: Primary | ICD-10-CM

## 2022-04-06 RX ORDER — SUCRALFATE 1 G/1
1 TABLET ORAL 4 TIMES DAILY
Qty: 56 TABLET | Refills: 0 | Status: SHIPPED | OUTPATIENT
Start: 2022-04-06 | End: 2022-04-18

## 2022-04-06 RX ORDER — BUDESONIDE 3 MG/1
CAPSULE, COATED PELLETS ORAL
Qty: 180 CAPSULE | Refills: 0 | Status: SHIPPED | OUTPATIENT
Start: 2022-04-06 | End: 2022-06-21

## 2022-04-06 NOTE — TELEPHONE ENCOUNTER
Spoke to pt and informed of Joaquina raya. Educated pt on importance of medication adherence. F/U scheduled on 08/30/2022. Pt verified understanding. Apt reminder mailed.     Pt was prescribed  Protonix 20mg qday on 03/31/2022 by Dr. Mcgee.   Please advice regarding alternative medication for pt r/t gastritis.

## 2022-04-06 NOTE — TELEPHONE ENCOUNTER
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  This is a chronic inflammatory disease of the colon that often causes chronic watery diarrhea.  It can be caused by smoking and some medications including NSAIDs and PPIs.  Recommend discontinuing these if possible.  It is treated with budesonide.  Taper sent to patient's pharmacy.  Please instruct to take as directed and schedule for follow-up.

## 2022-04-07 ENCOUNTER — HOSPITAL ENCOUNTER (EMERGENCY)
Facility: HOSPITAL | Age: 77
Discharge: HOME | End: 2022-04-07
Attending: EMERGENCY MEDICINE | Admitting: EMERGENCY MEDICINE

## 2022-04-07 VITALS
OXYGEN SATURATION: 98 % | HEART RATE: 76 BPM | HEIGHT: 63 IN | WEIGHT: 146.16 LBS | DIASTOLIC BLOOD PRESSURE: 61 MMHG | BODY MASS INDEX: 25.9 KG/M2 | SYSTOLIC BLOOD PRESSURE: 117 MMHG | TEMPERATURE: 97.7 F | RESPIRATION RATE: 20 BRPM

## 2022-04-07 DIAGNOSIS — N39.0 URINARY TRACT INFECTION WITHOUT HEMATURIA, SITE UNSPECIFIED: ICD-10-CM

## 2022-04-07 DIAGNOSIS — R19.7 DIARRHEA IN ADULT PATIENT: Primary | ICD-10-CM

## 2022-04-07 DIAGNOSIS — R11.0 NAUSEA: ICD-10-CM

## 2022-04-07 LAB
ALBUMIN SERPL-MCNC: 4.5 G/DL (ref 3.5–5.2)
ALBUMIN/GLOB SERPL: 1.6 G/DL
ALP SERPL-CCNC: 60 U/L (ref 39–117)
ALT SERPL W P-5'-P-CCNC: 11 U/L (ref 1–33)
ANION GAP SERPL CALCULATED.3IONS-SCNC: 15 MMOL/L (ref 5–15)
AST SERPL-CCNC: 17 U/L (ref 1–32)
BACTERIA UR QL AUTO: ABNORMAL /HPF
BASOPHILS # BLD AUTO: 0.03 10*3/MM3 (ref 0–0.2)
BASOPHILS NFR BLD AUTO: 0.3 % (ref 0–1.5)
BILIRUB SERPL-MCNC: 0.2 MG/DL (ref 0–1.2)
BILIRUB UR QL STRIP: NEGATIVE
BUN SERPL-MCNC: 33 MG/DL (ref 8–23)
BUN/CREAT SERPL: 23.7 (ref 7–25)
CALCIUM SPEC-SCNC: 10 MG/DL (ref 8.6–10.5)
CHLORIDE SERPL-SCNC: 102 MMOL/L (ref 98–107)
CLARITY UR: ABNORMAL
CO2 SERPL-SCNC: 21 MMOL/L (ref 22–29)
COLOR UR: YELLOW
CREAT SERPL-MCNC: 1.39 MG/DL (ref 0.57–1)
DEPRECATED RDW RBC AUTO: 54.9 FL (ref 37–54)
EGFRCR SERPLBLD CKD-EPI 2021: 39.7 ML/MIN/1.73
EOSINOPHIL # BLD AUTO: 0.19 10*3/MM3 (ref 0–0.4)
EOSINOPHIL NFR BLD AUTO: 2.2 % (ref 0.3–6.2)
ERYTHROCYTE [DISTWIDTH] IN BLOOD BY AUTOMATED COUNT: 14 % (ref 12.3–15.4)
GLOBULIN UR ELPH-MCNC: 2.9 GM/DL
GLUCOSE SERPL-MCNC: 130 MG/DL (ref 65–99)
GLUCOSE UR STRIP-MCNC: NEGATIVE MG/DL
HCT VFR BLD AUTO: 40.5 % (ref 34–46.6)
HGB BLD-MCNC: 13 G/DL (ref 12–15.9)
HGB UR QL STRIP.AUTO: ABNORMAL
HOLD SPECIMEN: NORMAL
HOLD SPECIMEN: NORMAL
HYALINE CASTS UR QL AUTO: ABNORMAL /LPF
IMM GRANULOCYTES # BLD AUTO: 0.03 10*3/MM3 (ref 0–0.05)
IMM GRANULOCYTES NFR BLD AUTO: 0.3 % (ref 0–0.5)
KETONES UR QL STRIP: NEGATIVE
LEUKOCYTE ESTERASE UR QL STRIP.AUTO: ABNORMAL
LIPASE SERPL-CCNC: 6 U/L (ref 13–60)
LYMPHOCYTES # BLD AUTO: 1.51 10*3/MM3 (ref 0.7–3.1)
LYMPHOCYTES NFR BLD AUTO: 17.3 % (ref 19.6–45.3)
MACROCYTES BLD QL SMEAR: NORMAL
MCH RBC QN AUTO: 33.8 PG (ref 26.6–33)
MCHC RBC AUTO-ENTMCNC: 32.1 G/DL (ref 31.5–35.7)
MCV RBC AUTO: 105.2 FL (ref 79–97)
MONOCYTES # BLD AUTO: 0.47 10*3/MM3 (ref 0.1–0.9)
MONOCYTES NFR BLD AUTO: 5.4 % (ref 5–12)
NEUTROPHILS NFR BLD AUTO: 6.48 10*3/MM3 (ref 1.7–7)
NEUTROPHILS NFR BLD AUTO: 74.5 % (ref 42.7–76)
NITRITE UR QL STRIP: POSITIVE
NRBC BLD AUTO-RTO: 0 /100 WBC (ref 0–0.2)
PH UR STRIP.AUTO: <=5 [PH] (ref 5–8)
PLATELET # BLD AUTO: 196 10*3/MM3 (ref 140–450)
PMV BLD AUTO: 10.4 FL (ref 6–12)
POTASSIUM SERPL-SCNC: 5 MMOL/L (ref 3.5–5.2)
PROT SERPL-MCNC: 7.4 G/DL (ref 6–8.5)
PROT UR QL STRIP: NEGATIVE
RBC # BLD AUTO: 3.85 10*6/MM3 (ref 3.77–5.28)
RBC # UR STRIP: ABNORMAL /HPF
REF LAB TEST METHOD: ABNORMAL
SMALL PLATELETS BLD QL SMEAR: ADEQUATE
SODIUM SERPL-SCNC: 138 MMOL/L (ref 136–145)
SP GR UR STRIP: 1.01 (ref 1–1.03)
SQUAMOUS #/AREA URNS HPF: ABNORMAL /HPF
UROBILINOGEN UR QL STRIP: ABNORMAL
WBC # UR STRIP: ABNORMAL /HPF
WBC MORPH BLD: NORMAL
WBC NRBC COR # BLD: 8.71 10*3/MM3 (ref 3.4–10.8)
WHOLE BLOOD HOLD SPECIMEN: NORMAL
WHOLE BLOOD HOLD SPECIMEN: NORMAL

## 2022-04-07 PROCEDURE — 87186 SC STD MICRODIL/AGAR DIL: CPT

## 2022-04-07 PROCEDURE — 87086 URINE CULTURE/COLONY COUNT: CPT

## 2022-04-07 PROCEDURE — 85025 COMPLETE CBC W/AUTO DIFF WBC: CPT

## 2022-04-07 PROCEDURE — 81001 URINALYSIS AUTO W/SCOPE: CPT

## 2022-04-07 PROCEDURE — 80053 COMPREHEN METABOLIC PANEL: CPT

## 2022-04-07 PROCEDURE — 85007 BL SMEAR W/DIFF WBC COUNT: CPT

## 2022-04-07 PROCEDURE — 83690 ASSAY OF LIPASE: CPT

## 2022-04-07 PROCEDURE — 87077 CULTURE AEROBIC IDENTIFY: CPT

## 2022-04-07 PROCEDURE — 99283 EMERGENCY DEPT VISIT LOW MDM: CPT

## 2022-04-07 PROCEDURE — 36415 COLL VENOUS BLD VENIPUNCTURE: CPT

## 2022-04-07 RX ORDER — LOPERAMIDE HYDROCHLORIDE 2 MG/1
2 CAPSULE ORAL 4 TIMES DAILY PRN
Qty: 35 CAPSULE | Refills: 0 | Status: SHIPPED | OUTPATIENT
Start: 2022-04-07 | End: 2023-02-28

## 2022-04-07 RX ORDER — SODIUM CHLORIDE 0.9 % (FLUSH) 0.9 %
10 SYRINGE (ML) INJECTION AS NEEDED
Status: DISCONTINUED | OUTPATIENT
Start: 2022-04-07 | End: 2022-04-08 | Stop reason: HOSPADM

## 2022-04-07 RX ORDER — LOPERAMIDE HYDROCHLORIDE 2 MG/1
4 CAPSULE ORAL ONCE
Status: COMPLETED | OUTPATIENT
Start: 2022-04-07 | End: 2022-04-07

## 2022-04-07 RX ORDER — ONDANSETRON 2 MG/ML
4 INJECTION INTRAMUSCULAR; INTRAVENOUS ONCE
Status: DISCONTINUED | OUTPATIENT
Start: 2022-04-07 | End: 2022-04-08 | Stop reason: HOSPADM

## 2022-04-07 RX ORDER — CEFUROXIME AXETIL 500 MG/1
500 TABLET ORAL 2 TIMES DAILY
Qty: 14 TABLET | Refills: 0 | Status: SHIPPED | OUTPATIENT
Start: 2022-04-07 | End: 2022-04-14

## 2022-04-07 RX ADMIN — LOPERAMIDE HYDROCHLORIDE 4 MG: 2 CAPSULE ORAL at 20:13

## 2022-04-07 RX ADMIN — SODIUM CHLORIDE 1000 ML: 9 INJECTION, SOLUTION INTRAVENOUS at 20:13

## 2022-04-07 NOTE — TELEPHONE ENCOUNTER
Has she started the budesonide and the Carafate?  If she feels that she's dehydrated, will need evaluation in the ER for fluids.

## 2022-04-07 NOTE — TELEPHONE ENCOUNTER
Pt called reporting an increase of diarrhea stating that she feels very week and is using a walker in her home; pt requesting guidance stating that she can not handle the increase of diarrhea.    Pt picked up her budesonide on 04/06/2022. Pt does not report any nausea, vomiting, or abdominal pain.     Please advise.

## 2022-04-07 NOTE — TELEPHONE ENCOUNTER
Pt started taking Carafate and budesonide this am.   Spoke to pt and informed of Joaquina raya.   Pt verified understanding.

## 2022-04-07 NOTE — ED PROVIDER NOTES
Subjective   Patient is 76-year-old female presenting today due to nausea, diarrhea for 1 month and weakness in the legs that started today.  Patient saw Dr. Mcgee last Thursday and got upper and lower scope and was diagnosed with gastritis, she was given budesonide and Carafate.  Patient states that she was tested for C. difficile 2 weeks ago and is negative.  Patient also has a UTI and had a UTI previously and was given Keflex states that she finished the antibiotic.  Patient rates her pain a 0 out of 10 denying abdominal pain.  Patient has a history of diabetes, hypertension and Hyperlipidemia.  Patient denies fever and chills, she denies a history of dementia.  Patient denies dysuria and hematuria.      History provided by:  Patient   used: No    Diarrhea  The primary symptoms include nausea, vomiting and diarrhea. Primary symptoms do not include fever, abdominal pain or dysuria.   The illness does not include chills. Associated medical issues comments: gastritis.       Review of Systems   Constitutional: Negative.  Negative for chills and fever.   HENT: Negative.    Eyes: Negative.    Respiratory: Negative.    Cardiovascular: Negative.    Gastrointestinal: Positive for diarrhea, nausea and vomiting. Negative for abdominal pain.   Endocrine: Negative.    Genitourinary: Negative.  Negative for dysuria.   Musculoskeletal: Negative.    Skin: Negative.    Allergic/Immunologic: Negative.    Neurological: Negative.    Hematological: Negative.    Psychiatric/Behavioral: Negative.        Past Medical History:   Diagnosis Date   • Arthritis    • Bladder disorder    • Diabetes (HCC)    • Hyperlipemia    • Hypertension    • Kidney disease    • Limb swelling    • Neurologic disorder    • PONV (postoperative nausea and vomiting)    • Seasonal allergies    • Thyroid disorder    • Tremors of nervous system        Allergies   Allergen Reactions   • Adhesive Tape Other (See Comments)     BLISTERS,SKIN  "BURN; \"PAPER TAPE IS FINE\"     • Amoxicillin-Pot Clavulanate Nausea Only     NAUSEA AND SEVERE DIARRHEA     • Iodine Rash     BLISTERS       Past Surgical History:   Procedure Laterality Date   • APPENDECTOMY     • CATARACT EXTRACTION     • COLONOSCOPY  2011    [facility]   • COLONOSCOPY N/A 3/31/2022    Procedure: COLONOSCOPY;  Surgeon: Little Mcgee MD;  Location: [facility] ENDOSCOPY;  Service: Gastroenterology;  Laterality: N/A;  DIVERTICULOSIS COLITIS   • ENDOSCOPY N/A 3/31/2022    Procedure: ESOPHAGOGASTRODUODENOSCOPY;  Surgeon: Little Mcgee MD;  Location: [facility] ENDOSCOPY;  Service: Gastroenterology;  Laterality: N/A;  GASTRITIS   • HYSTERECTOMY     • PARATHYROIDECTOMY  2014   • UPPER GASTROINTESTINAL ENDOSCOPY  2011    [facility]       Family History   Problem Relation Age of Onset   • Cancer Mother    • Cancer Father    • Malig Hyperthermia Neg Hx        Social History     Socioeconomic History   • Marital status:    Tobacco Use   • Smoking status: Never Smoker   • Smokeless tobacco: Never Used   Vaping Use   • Vaping Use: Never used   Substance and Sexual Activity   • Alcohol use: Never   • Drug use: Never           Objective   Physical Exam  Vitals and nursing note reviewed.   Constitutional:       Appearance: Normal appearance.   HENT:      Head: Normocephalic and atraumatic.      Nose: Nose normal.      Mouth/Throat:      Mouth: Mucous membranes are moist.   Eyes:      Extraocular Movements: Extraocular movements intact.      Pupils: Pupils are equal, round, and reactive to light.   Cardiovascular:      Rate and Rhythm: Normal rate and regular rhythm.   Pulmonary:      Effort: Pulmonary effort is normal.      Breath sounds: Normal breath sounds.   Abdominal:      General: Abdomen is flat. Bowel sounds are increased.      Palpations: Abdomen is soft.      Tenderness: There is no abdominal tenderness.   Musculoskeletal:         General: Normal range of motion.      Cervical back: Normal range of " motion and neck supple.   Skin:     General: Skin is warm and dry.   Neurological:      General: No focal deficit present.      Mental Status: She is alert and oriented to person, place, and time.   Psychiatric:         Mood and Affect: Mood normal.         Behavior: Behavior normal.         Procedures           ED Course  ED Course as of 04/07/22 2223   Thu Apr 07, 2022 2143 Spoke to Dr. De Dios with GI about recent scopes, she agreed that no CT scan at this time is needed and patient can treat diarrhea at home.     Patient had one episode of diarrhea during the ED visit but none after receiving loperamide, patient's vital signs are stable and she is drinking fluids.  When asked if she agrees with treating her diarrhea at home and being comfortable with the plan patient stated yes.     Pt states she also does not have any nausea    [AJ]      ED Course User Index  [AJ] Sana Salinas, KATLIN                                                 MDM  Number of Diagnoses or Management Options  Diagnosis management comments: After calling Dr. De Dios and going over patient's recent diagnosis of colitis and new medications come to find out patient has not tried anything for diarrhea due to not knowing what she was taking from Dr. Mcgee was for  colitis and not for diarrhea. Pt has not tried any antidiarrheals and she was agreeable to be treated outpatient for diarrhea as well as UTI and to follow-up with her primary care if diarrhea persists.     I have spoken with patient. I have explained the patient´s condition, diagnoses and treatment plan based on the information available to me at this time. I have answered the patient's questions and addressed any concerns. The patient has a good  understanding of the patient´s diagnosis, condition, and treatment plan as can be expected at this point. The vital signs have been stable. The patient´s condition is stable and appropriate for discharge from the emergency department.       The patient will pursue further outpatient evaluation with the primary care physician or other designated or consulting physician as outlined in the discharge instructions. They are agreeable to this plan of care and follow-up instructions have been explained in detail. The patient has received these instructions in written format and have expressed an understanding of the discharge instructions. The patient is aware that any significant change in condition or worsening of symptoms should prompt an immediate return to this or the closest emergency department or call to 911.         Amount and/or Complexity of Data Reviewed  Clinical lab tests: reviewed  Decide to obtain previous medical records or to obtain history from someone other than the patient: yes    Risk of Complications, Morbidity, and/or Mortality  Presenting problems: low  Diagnostic procedures: low  Management options: low    Patient Progress  Patient progress: stable      Final diagnoses:   Diarrhea in adult patient   Nausea   Urinary tract infection without hematuria, site unspecified       ED Disposition  ED Disposition     ED Disposition   Discharge    Condition   Stable    Comment   --             Jennie Reynolds, APRN  [address] [facility] [address]  [facility] [address]  [phone]    Schedule an appointment as soon as possible for a visit   If symptoms worsen         Medication List      New Prescriptions    cefuroxime 500 MG tablet  Commonly known as: CEFTIN  Take 1 tablet by mouth 2 (Two) Times a Day for 7 days.     loperamide 2 MG capsule  Commonly known as: IMODIUM  Take 1 capsule by mouth 4 (Four) Times a Day As Needed for Diarrhea.           Where to Get Your Medications      These medications were sent to [facility] [address] [place] [address], [place] - [address] [facility] AT [facility] [address] - [phone]  - [phone] [place] [id number]    Phone: [phone]   · cefuroxime 500 MG tablet  · loperamide 2  MG capsule          Sana Salinas PA-C  04/07/22 2283

## 2022-04-08 NOTE — DISCHARGE INSTRUCTIONS
Please take loperamide as prescribed until diarrhea symptoms are resolved.   Please make sure to finish your antibiotics for UTI  Please follow-up with your primary care provider next week and get checked to make sure your UTI is gone.   Please drink lots of fluids.

## 2022-04-09 LAB — BACTERIA SPEC AEROBE CULT: ABNORMAL

## 2022-04-18 RX ORDER — SUCRALFATE 1 G/1
TABLET ORAL
Qty: 56 TABLET | Refills: 0 | Status: SHIPPED | OUTPATIENT
Start: 2022-04-18 | End: 2022-04-29 | Stop reason: SDUPTHER

## 2022-04-29 RX ORDER — SUCRALFATE 1 G/1
TABLET ORAL
Qty: 56 TABLET | Refills: 0 | Status: SHIPPED | OUTPATIENT
Start: 2022-04-29 | End: 2022-05-16 | Stop reason: SDUPTHER

## 2022-05-03 ENCOUNTER — TELEPHONE (OUTPATIENT)
Dept: GASTROENTEROLOGY | Facility: CLINIC | Age: 77
End: 2022-05-03

## 2022-05-03 NOTE — TELEPHONE ENCOUNTER
Patient has called and left a  requesting a return call in regards to a medication question.     Patient states that [facility] refilled her pantoprazole. Patient would like to know if she should should continue to hold medicaiton. Please advise.

## 2022-05-05 ENCOUNTER — TELEPHONE (OUTPATIENT)
Dept: GASTROENTEROLOGY | Facility: CLINIC | Age: 77
End: 2022-05-05

## 2022-05-05 NOTE — TELEPHONE ENCOUNTER
"Patient has called the office and left a vm stating that she is experiencing a \"touch of diarrhea\" and would like to know what you would recommend to take for it?  Patient states that she had diarrhea this morning but is no longer diarrhea. But her stomach still feels \"achy\" but she also hasnt eaten anything else.Patient states that she has not tried anything otc.   Please advise.      "

## 2022-05-06 NOTE — TELEPHONE ENCOUNTER
Patient aware to not take protonix.   Patient states that she is taking budesonide 3 mg 2 tablets of a morning.   Patient is no longer having diarrhea.

## 2022-05-06 NOTE — TELEPHONE ENCOUNTER
She should continue to hold protonix.  Is she still taking budesonide?  If so, what dose? Is she continuing to have diarrhea.

## 2022-05-06 NOTE — TELEPHONE ENCOUNTER
Okay.  Recommend that she continue budesonide as prescribed.  She may have occasional diarrhea, but nothing that lasts more than a few days.

## 2022-05-09 ENCOUNTER — TRANSCRIBE ORDERS (OUTPATIENT)
Dept: LAB | Facility: HOSPITAL | Age: 77
End: 2022-05-09

## 2022-05-09 ENCOUNTER — LAB (OUTPATIENT)
Dept: LAB | Facility: HOSPITAL | Age: 77
End: 2022-05-09

## 2022-05-09 DIAGNOSIS — N18.30 STAGE 3 CHRONIC KIDNEY DISEASE, UNSPECIFIED WHETHER STAGE 3A OR 3B CKD: Primary | ICD-10-CM

## 2022-05-09 DIAGNOSIS — N18.30 STAGE 3 CHRONIC KIDNEY DISEASE, UNSPECIFIED WHETHER STAGE 3A OR 3B CKD: ICD-10-CM

## 2022-05-09 LAB
25(OH)D3 SERPL-MCNC: 56.3 NG/ML (ref 30–100)
ALBUMIN SERPL-MCNC: 4.4 G/DL (ref 3.5–5.2)
ANION GAP SERPL CALCULATED.3IONS-SCNC: 13.2 MMOL/L (ref 5–15)
BACTERIA UR QL AUTO: ABNORMAL /HPF
BASOPHILS # BLD AUTO: 0.05 10*3/MM3 (ref 0–0.2)
BASOPHILS NFR BLD AUTO: 0.6 % (ref 0–1.5)
BILIRUB UR QL STRIP: NEGATIVE
BUN SERPL-MCNC: 32 MG/DL (ref 8–23)
BUN/CREAT SERPL: 28.3 (ref 7–25)
CALCIUM SPEC-SCNC: 10.3 MG/DL (ref 8.6–10.5)
CHLORIDE SERPL-SCNC: 103 MMOL/L (ref 98–107)
CLARITY UR: ABNORMAL
CO2 SERPL-SCNC: 22.8 MMOL/L (ref 22–29)
COLOR UR: YELLOW
CREAT SERPL-MCNC: 1.13 MG/DL (ref 0.57–1)
CREAT UR-MCNC: 17.6 MG/DL
DEPRECATED RDW RBC AUTO: 47.2 FL (ref 37–54)
EGFRCR SERPLBLD CKD-EPI 2021: 50.8 ML/MIN/1.73
EOSINOPHIL # BLD AUTO: 0.13 10*3/MM3 (ref 0–0.4)
EOSINOPHIL NFR BLD AUTO: 1.7 % (ref 0.3–6.2)
ERYTHROCYTE [DISTWIDTH] IN BLOOD BY AUTOMATED COUNT: 12.6 % (ref 12.3–15.4)
GLUCOSE SERPL-MCNC: 107 MG/DL (ref 65–99)
GLUCOSE UR STRIP-MCNC: NEGATIVE MG/DL
HCT VFR BLD AUTO: 43.1 % (ref 34–46.6)
HGB BLD-MCNC: 14.4 G/DL (ref 12–15.9)
HGB UR QL STRIP.AUTO: ABNORMAL
HYALINE CASTS UR QL AUTO: ABNORMAL /LPF
IMM GRANULOCYTES # BLD AUTO: 0.03 10*3/MM3 (ref 0–0.05)
IMM GRANULOCYTES NFR BLD AUTO: 0.4 % (ref 0–0.5)
KETONES UR QL STRIP: NEGATIVE
LEUKOCYTE ESTERASE UR QL STRIP.AUTO: ABNORMAL
LYMPHOCYTES # BLD AUTO: 1.72 10*3/MM3 (ref 0.7–3.1)
LYMPHOCYTES NFR BLD AUTO: 22.3 % (ref 19.6–45.3)
MCH RBC QN AUTO: 34.3 PG (ref 26.6–33)
MCHC RBC AUTO-ENTMCNC: 33.4 G/DL (ref 31.5–35.7)
MCV RBC AUTO: 102.6 FL (ref 79–97)
MONOCYTES # BLD AUTO: 0.42 10*3/MM3 (ref 0.1–0.9)
MONOCYTES NFR BLD AUTO: 5.4 % (ref 5–12)
NEUTROPHILS NFR BLD AUTO: 5.38 10*3/MM3 (ref 1.7–7)
NEUTROPHILS NFR BLD AUTO: 69.6 % (ref 42.7–76)
NITRITE UR QL STRIP: POSITIVE
NRBC BLD AUTO-RTO: 0 /100 WBC (ref 0–0.2)
PH UR STRIP.AUTO: 7 [PH] (ref 5–8)
PHOSPHATE SERPL-MCNC: 2.6 MG/DL (ref 2.5–4.5)
PLATELET # BLD AUTO: 233 10*3/MM3 (ref 140–450)
PMV BLD AUTO: 10.7 FL (ref 6–12)
POTASSIUM SERPL-SCNC: 4.5 MMOL/L (ref 3.5–5.2)
PROT ?TM UR-MCNC: 78.1 MG/DL
PROT UR QL STRIP: ABNORMAL
PROT/CREAT UR: 4.44 MG/G{CREAT}
PTH-INTACT SERPL-MCNC: 15.4 PG/ML (ref 15–65)
RBC # BLD AUTO: 4.2 10*6/MM3 (ref 3.77–5.28)
RBC # UR STRIP: ABNORMAL /HPF
REF LAB TEST METHOD: ABNORMAL
SODIUM SERPL-SCNC: 139 MMOL/L (ref 136–145)
SP GR UR STRIP: 1.01 (ref 1–1.03)
SQUAMOUS #/AREA URNS HPF: ABNORMAL /HPF
UROBILINOGEN UR QL STRIP: ABNORMAL
WBC # UR STRIP: ABNORMAL /HPF
WBC NRBC COR # BLD: 7.73 10*3/MM3 (ref 3.4–10.8)

## 2022-05-09 PROCEDURE — 80069 RENAL FUNCTION PANEL: CPT

## 2022-05-09 PROCEDURE — 82570 ASSAY OF URINE CREATININE: CPT

## 2022-05-09 PROCEDURE — 81001 URINALYSIS AUTO W/SCOPE: CPT

## 2022-05-09 PROCEDURE — 84156 ASSAY OF PROTEIN URINE: CPT

## 2022-05-09 PROCEDURE — 85025 COMPLETE CBC W/AUTO DIFF WBC: CPT

## 2022-05-09 PROCEDURE — 83970 ASSAY OF PARATHORMONE: CPT

## 2022-05-09 PROCEDURE — 36415 COLL VENOUS BLD VENIPUNCTURE: CPT

## 2022-05-09 PROCEDURE — 82306 VITAMIN D 25 HYDROXY: CPT

## 2022-05-16 RX ORDER — SUCRALFATE 1 G/1
TABLET ORAL
Qty: 56 TABLET | Refills: 0 | Status: SHIPPED | OUTPATIENT
Start: 2022-05-16 | End: 2022-05-28

## 2022-05-23 ENCOUNTER — TRANSCRIBE ORDERS (OUTPATIENT)
Dept: ADMINISTRATIVE | Facility: HOSPITAL | Age: 77
End: 2022-05-23

## 2022-05-23 DIAGNOSIS — F07.81 POSTCONCUSSION SYNDROME: Primary | ICD-10-CM

## 2022-05-26 RX ORDER — PANTOPRAZOLE SODIUM 20 MG/1
TABLET, DELAYED RELEASE ORAL
Qty: 30 TABLET | Refills: 1 | Status: SHIPPED | OUTPATIENT
Start: 2022-05-26 | End: 2022-06-21 | Stop reason: SDUPTHER

## 2022-05-28 RX ORDER — SUCRALFATE 1 G/1
TABLET ORAL
Qty: 56 TABLET | Refills: 0 | Status: SHIPPED | OUTPATIENT
Start: 2022-05-28 | End: 2022-08-30

## 2022-06-17 ENCOUNTER — HOSPITAL ENCOUNTER (OUTPATIENT)
Dept: MRI IMAGING | Facility: HOSPITAL | Age: 77
Discharge: HOME | End: 2022-06-17
Admitting: PSYCHIATRY & NEUROLOGY

## 2022-06-17 DIAGNOSIS — F07.81 POSTCONCUSSION SYNDROME: ICD-10-CM

## 2022-06-17 PROCEDURE — 70551 MRI BRAIN STEM W/O DYE: CPT

## 2022-06-20 DIAGNOSIS — K52.831 COLLAGENOUS COLITIS: ICD-10-CM

## 2022-06-21 RX ORDER — BUDESONIDE 3 MG/1
CAPSULE, COATED PELLETS ORAL
Qty: 180 CAPSULE | Refills: 0 | Status: SHIPPED | OUTPATIENT
Start: 2022-06-21 | End: 2022-08-30

## 2022-06-21 RX ORDER — PANTOPRAZOLE SODIUM 20 MG/1
20 TABLET, DELAYED RELEASE ORAL DAILY
Qty: 30 TABLET | Refills: 1 | Status: SHIPPED | OUTPATIENT
Start: 2022-06-21 | End: 2022-08-30

## 2022-06-21 NOTE — TELEPHONE ENCOUNTER
Patient is requesting a refill of Pantoprazole, I am showing that she has 1 refill but for some reason [facility] does not have that she has a refill, so she has not been able to get this filled.

## 2022-08-30 ENCOUNTER — OFFICE VISIT (OUTPATIENT)
Dept: GASTROENTEROLOGY | Facility: CLINIC | Age: 77
End: 2022-08-30

## 2022-08-30 VITALS
BODY MASS INDEX: 24.63 KG/M2 | SYSTOLIC BLOOD PRESSURE: 124 MMHG | HEART RATE: 75 BPM | HEIGHT: 63 IN | DIASTOLIC BLOOD PRESSURE: 68 MMHG | WEIGHT: 139 LBS

## 2022-08-30 DIAGNOSIS — K52.831 COLLAGENOUS COLITIS: Primary | ICD-10-CM

## 2022-08-30 DIAGNOSIS — K29.50 CHRONIC GASTRITIS WITHOUT BLEEDING, UNSPECIFIED GASTRITIS TYPE: ICD-10-CM

## 2022-08-30 PROCEDURE — 99214 OFFICE O/P EST MOD 30 MIN: CPT | Performed by: NURSE PRACTITIONER

## 2022-08-30 NOTE — PATIENT INSTRUCTIONS
Begin taking protonix every other day for 2 weeks.  If no symptoms of reflux, okay to discontinue.  May take over the counter pepcid as needed for heartburn.      In 2 weeks, stop taking budesonide.  If frequent diarrhea returns, call the office.

## 2022-08-30 NOTE — PROGRESS NOTES
Chief Complaint     Follow-up (Egd/Colonoscopy)    History of Present Illness     Salome Reeves is a 76 y.o. female who presents to [facility] for follow-up of chronic pancreatitis, diarrhea and epigastric pain.    She is currently taking budesonide 3 mg daily.  States that she's having a daily formed bowel movement.  She is now able to eat many foods that she wasn't able to eat in the past.      Taking protonix 20 mg daily and reports that reflux is controlled.       History      Past Medical History:   Diagnosis Date   • Arthritis    • Bladder disorder    • Diabetes (HCC)    • Hyperlipemia    • Hypertension    • Kidney disease    • Limb swelling    • Neurologic disorder    • PONV (postoperative nausea and vomiting)    • Seasonal allergies    • Thyroid disorder    • Tremors of nervous system      Past Surgical History:   Procedure Laterality Date   • APPENDECTOMY     • CATARACT EXTRACTION     • COLONOSCOPY  2011    [facility]   • COLONOSCOPY N/A 3/31/2022    Procedure: COLONOSCOPY;  Surgeon: Little Mcgee MD;  Location: [facility] ENDOSCOPY;  Service: Gastroenterology;  Laterality: N/A;  DIVERTICULOSIS COLITIS   • ENDOSCOPY N/A 3/31/2022    Procedure: ESOPHAGOGASTRODUODENOSCOPY;  Surgeon: Little Mcgee MD;  Location: [facility] ENDOSCOPY;  Service: Gastroenterology;  Laterality: N/A;  GASTRITIS   • HYSTERECTOMY     • PARATHYROIDECTOMY  2014   • UPPER GASTROINTESTINAL ENDOSCOPY  2011    [facility]     Family History   Problem Relation Age of Onset   • Cancer Mother    • Cancer Father    • Malig Hyperthermia Neg Hx         Current Medications       Current Outpatient Medications:   •  glimepiride (AMARYL) 2 MG tablet, glimepiride 2 mg oral tablet take 1 tablet (2 mg) by oral route once daily   Active, Disp: , Rfl:   •  lisinopril (PRINIVIL,ZESTRIL) 5 MG tablet, Take 5 mg by mouth Daily., Disp: , Rfl:   •  loperamide (IMODIUM) 2 MG capsule, Take 1 capsule by mouth 4 (Four) Times a  "Day As Needed for Diarrhea., Disp: 35 capsule, Rfl: 0  •  lovastatin (MEVACOR) 20 MG tablet, Take 20 mg by mouth Every Night., Disp: , Rfl:   •  metFORMIN ER (GLUCOPHAGE-XR) 500 MG 24 hr tablet, Take 1,000 mg by mouth Daily. 2 tabs, Disp: , Rfl:   •  primidone (MYSOLINE) 250 MG tablet, Take 250 mg by mouth Every Night., Disp: , Rfl:   •  propranolol XL (INNOPRAN XL) 80 MG 24 hr capsule, , Disp: , Rfl:   •  sodium bicarbonate 325 MG tablet, Take 325 mg by mouth Daily., Disp: , Rfl:   •  topiramate (TOPAMAX) 100 MG tablet, Take 100 mg by mouth 2 (Two) Times a Day., Disp: , Rfl:      Allergies     Allergies   Allergen Reactions   • Adhesive Tape Other (See Comments)     BLISTERS,SKIN BURN; \"PAPER TAPE IS FINE\"     • Amoxicillin-Pot Clavulanate Nausea Only     NAUSEA AND SEVERE DIARRHEA     • Iodine Rash     BLISTERS       Social History       Social History     Social History Narrative   • Not on file           Objective       /68 (BP Location: Left arm, Patient Position: Sitting, Cuff Size: Adult)   Pulse 75   Ht 160 cm (63\")   Wt 63 kg (139 lb)   BMI 24.62 kg/m²       Physical Exam  Constitutional:       General: She is not in acute distress.     Appearance: Normal appearance. She is well-developed and normal weight.   HENT:      Head: Normocephalic and atraumatic.   Eyes:      Conjunctiva/sclera: Conjunctivae normal.      Pupils: Pupils are equal, round, and reactive to light.      Visual Fields: Right eye visual fields normal and left eye visual fields normal.   Cardiovascular:      Rate and Rhythm: Normal rate and regular rhythm.      Heart sounds: Normal heart sounds.   Pulmonary:      Effort: Pulmonary effort is normal. No retractions.      Breath sounds: Normal breath sounds and air entry.   Abdominal:      General: Bowel sounds are normal.      Palpations: Abdomen is soft.      Tenderness: There is no abdominal tenderness.      Comments: No appreciable hepatosplenomegaly or ascites   Musculoskeletal: "         General: Normal range of motion.      Cervical back: Neck supple.      Right lower leg: No edema.      Left lower leg: No edema.   Lymphadenopathy:      Cervical: No cervical adenopathy.   Skin:     General: Skin is warm and dry.      Findings: No lesion.   Neurological:      General: No focal deficit present.      Mental Status: She is alert and oriented to person, place, and time.   Psychiatric:         Mood and Affect: Mood and affect normal.         Behavior: Behavior normal.         Results       Result Review :     The following data was reviewed by: VERÓNICA Srinivasan on 08/30/2022:    3/23/2022 enteric bacterial panel-negative, Enteric parasite panel-negative, C. difficile-negative, occult blood-negative.     3/31/2022 colonoscopy-Right colon biopsy-suggestive of collagenous colitis.  Left colon biopsy-collagenous colitis.  EGD-gastric antrum with mild chronic inactive gastritis.  Normal duodenum.               Assessment and Plan              Diagnoses and all orders for this visit:    1. Collagenous colitis (Primary)    2. Chronic gastritis without bleeding, unspecified gastritis type      Discussed tapering off of Protonix as this medication can cause recurrence of microscopic colitis.  Patient verbalized understanding.  Written instructions given on taper.  Recommend that she discontinue budesonide.  Discussed with patient if she develops diarrhea to contact office.    I spent 34 minutes caring for Salome on this date of service. This time includes time spent by me in the following activities:preparing for the visit, performing a medically appropriate examination and/or evaluation , counseling and educating the patient/family/caregiver, documenting information in the medical record and independently interpreting results and communicating that information with the patient/family/caregiver  Follow Up     Follow Up   Return in about 6 months (around 2/28/2023) for Diarrhea.  Patient was given  instructions and counseling regarding her condition or for health maintenance advice. Please see specific information pulled into the AVS if appropriate.

## 2022-09-14 RX ORDER — PANTOPRAZOLE SODIUM 20 MG/1
TABLET, DELAYED RELEASE ORAL
Qty: 90 TABLET | Refills: 1 | Status: SHIPPED | OUTPATIENT
Start: 2022-09-14 | End: 2023-02-28

## 2022-10-12 ENCOUNTER — OFFICE VISIT (OUTPATIENT)
Dept: ORTHOPEDIC SURGERY | Facility: CLINIC | Age: 77
End: 2022-10-12

## 2022-10-12 VITALS — HEIGHT: 63 IN | WEIGHT: 160 LBS | BODY MASS INDEX: 28.35 KG/M2 | HEART RATE: 61 BPM | OXYGEN SATURATION: 92 %

## 2022-10-12 DIAGNOSIS — M17.11 PRIMARY OSTEOARTHRITIS OF RIGHT KNEE: ICD-10-CM

## 2022-10-12 DIAGNOSIS — M25.561 RIGHT KNEE PAIN, UNSPECIFIED CHRONICITY: Primary | ICD-10-CM

## 2022-10-12 DIAGNOSIS — S89.91XA INJURY OF RIGHT KNEE, INITIAL ENCOUNTER: ICD-10-CM

## 2022-10-12 PROCEDURE — 99203 OFFICE O/P NEW LOW 30 MIN: CPT | Performed by: ORTHOPAEDIC SURGERY

## 2022-10-12 NOTE — PROGRESS NOTES
"Chief Complaint  Initial Evaluation of the Right Knee     Subjective      Salome Reeves presents to [facility] for an evaluation of right knee. She had twisted the knee and hit the concrete. This occurred 2 weeks ago. She states pain hasn't improved since her fall. She hasn't had prior knee pain. She states is is able to weight bear and walk.     Allergies   Allergen Reactions   • Adhesive Tape Other (See Comments)     BLISTERS,SKIN BURN; \"PAPER TAPE IS FINE\"     • Amoxicillin-Pot Clavulanate Nausea Only     NAUSEA AND SEVERE DIARRHEA     • Iodine Rash     BLISTERS        Social History     Socioeconomic History   • Marital status:    Tobacco Use   • Smoking status: Never   • Smokeless tobacco: Never   Vaping Use   • Vaping Use: Never used   Substance and Sexual Activity   • Alcohol use: Never   • Drug use: Never   • Sexual activity: Defer        Review of Systems     Objective   Vital Signs:   Pulse 61   Ht 160 cm (63\")   Wt 72.6 kg (160 lb)   SpO2 92%   BMI 28.34 kg/m²       Physical Exam  Constitutional:       Appearance: Normal appearance. Patient is well-developed and normal weight.   HENT:      Head: Normocephalic.      Right Ear: Hearing and external ear normal.      Left Ear: Hearing and external ear normal.      Nose: Nose normal.   Eyes:      Conjunctiva/sclera: Conjunctivae normal.   Cardiovascular:      Rate and Rhythm: Normal rate.   Pulmonary:      Effort: Pulmonary effort is normal.      Breath sounds: No wheezing or rales.   Abdominal:      Palpations: Abdomen is soft.      Tenderness: There is no abdominal tenderness.   Musculoskeletal:      Cervical back: Normal range of motion.   Skin:     Findings: No rash.   Neurological:      Mental Status: Patient  is alert and oriented to person, place, and time.   Psychiatric:         Mood and Affect: Mood and affect normal.         Judgment: Judgment normal.       Ortho Exam      RIGHT KNEE: Tender patella. " Tender medial joint line. Mild swelling. -10 degrees of extension. Flexion to 110 degrees. Ambulation with a wheelchair. Dorsal Pedal Pulse 2+, posterior tibialis pulse 2+. Sensation grossly intact. Neurovascular intact.        Procedures        Imaging Results (Most Recent)     Procedure Component Value Units Date/Time    XR Knee 3 View Right [[id number]] Resulted: 10/12/22 102     Updated: 10/12/22 1032           Result Review :     X-Ray Report:  Right knee(s) X-Ray  Indication: Evaluation of right knee pain   AP, Lateral and Standing view(s)  Findings: Preexisting arthritis of the right knee. No acute fractures.   Prior studies available for comparison: no         XR Knee 4 or More Vws RT    Result Date: 10/8/2022  Narrative: REVIEWING YOUR TEST RESULTS IN [facility] IS NOT A SUBSTITUTE FOR DISCUSSING THOSE RESULTS WITH YOUR HEALTH CARE PROVIDER. PLEASE CONTACT YOUR PROVIDER VIA [facility] TO DISCUSS ANY QUESTIONS OR CONCERNS YOU MAY HAVE REGARDING THESE TEST RESULTS.  RADIOLOGY REPORT FACILITY:  [email or web address]  OP      AGE:76 Y          SEX:F PATIENT NAME/:  KATE CHRISTIANSON RAINEY    1945 UNIT NUMBER:  [id number] ACCOUNT NUMBER:  [id number] ACCESSION NUMBER:  [id number] EXAMINATION: XR KNEE 4 OR MORE VWS RT DATE: 10/08/2022 COMPARISON: 2022 HISTORY: Injury and fall FINDINGS/IMPRESSION: Soft tissues are within normal limits. There is atherosclerosis of the femoral branch vessels. Mild osteopenia. There is no acute fracture. Chondrocalcinosis of the menisci. There is tricompartmental osteoarthritis moderate at the medial compartment. Dictated by: Viviana Brantley M.D. Images and Report reviewed and interpreted by: Viviana Brantley M.D.   <PS><Electronically signed by: Viviana Brantley M.D.> 10/08/2022 1441 D: 10/08/2022 1440 T: 10/08/2022 1440             Assessment and Plan     Diagnoses and all orders for this visit:    1. Right knee pain, unspecified chronicity  (Primary)  -     XR Knee 3 View Right    2. Injury of right knee, initial encounter    3. Primary osteoarthritis of right knee        Discussed giving her knee time to improve in pain and patient was offered an injection. She opted to give her knee more time.   May consider an injection next visit if she needs it.     Call or return if worsening symptoms.    Follow Up     1 month       Patient was given instructions and counseling regarding her condition or for health maintenance advice. Please see specific information pulled into the AVS if appropriate.     Scribed for Deangelo Galaviz MD by Saba Cramer.  10/12/22   11:02 EDT      I have personally performed the services described in this document as scribed by the above individual and it is both accurate and complete. Deangelo Galaviz MD 10/12/22

## 2022-11-14 ENCOUNTER — OFFICE VISIT (OUTPATIENT)
Dept: ORTHOPEDIC SURGERY | Facility: CLINIC | Age: 77
End: 2022-11-14

## 2022-11-14 VITALS — OXYGEN SATURATION: 96 % | HEART RATE: 83 BPM | WEIGHT: 144 LBS | BODY MASS INDEX: 25.52 KG/M2 | HEIGHT: 63 IN

## 2022-11-14 DIAGNOSIS — M25.561 RIGHT KNEE PAIN, UNSPECIFIED CHRONICITY: ICD-10-CM

## 2022-11-14 DIAGNOSIS — M17.11 PRIMARY OSTEOARTHRITIS OF RIGHT KNEE: Primary | ICD-10-CM

## 2022-11-14 PROCEDURE — 20610 DRAIN/INJ JOINT/BURSA W/O US: CPT | Performed by: PHYSICIAN ASSISTANT

## 2022-11-14 RX ORDER — LIDOCAINE HYDROCHLORIDE 10 MG/ML
5 INJECTION, SOLUTION INFILTRATION; PERINEURAL
Status: COMPLETED | OUTPATIENT
Start: 2022-11-14 | End: 2022-11-14

## 2022-11-14 RX ORDER — TRIAMCINOLONE ACETONIDE 40 MG/ML
40 INJECTION, SUSPENSION INTRA-ARTICULAR; INTRAMUSCULAR
Status: COMPLETED | OUTPATIENT
Start: 2022-11-14 | End: 2022-11-14

## 2022-11-14 RX ADMIN — TRIAMCINOLONE ACETONIDE 40 MG: 40 INJECTION, SUSPENSION INTRA-ARTICULAR; INTRAMUSCULAR at 13:26

## 2022-11-14 RX ADMIN — LIDOCAINE HYDROCHLORIDE 5 ML: 10 INJECTION, SOLUTION INFILTRATION; PERINEURAL at 13:26

## 2022-11-14 NOTE — PATIENT INSTRUCTIONS
Right knee steroid injection administered today in office. Advised on duration between injections. Can consider visco injection after 6 weeks, if needed.     Follow up in 6 weeks. No imaging. Call with questions or concerns.

## 2022-11-14 NOTE — PROGRESS NOTES
"Chief Complaint  Pain and Follow-up of the Right Knee    Subjective      Salome Reeves presents to [facility] for follow-up of right knee pain and osteoarthritis.  She was initially evaluated in clinic on 10/12/2022 by Dr. Galaviz after recent fall.  X-rays revealed no acute fractures, however, and tricompartmental osteoarthritis, most severe at the medial compartment.  Patient elected to hold off on knee injection at that time, however, presents today for follow-up with use of cane, reporting her pain is \"still just as bad as it was\".  She is requesting a right knee steroid injection today.    Objective   Allergies   Allergen Reactions   • Adhesive Tape Other (See Comments)     BLISTERS,SKIN BURN; \"PAPER TAPE IS FINE\"     • Amoxicillin-Pot Clavulanate Nausea Only     NAUSEA AND SEVERE DIARRHEA     • Iodine Rash     BLISTERS       Vital Signs:   Pulse 83   Ht 160 cm (63\")   Wt 65.3 kg (144 lb)   SpO2 96%   BMI 25.51 kg/m²       Physical Exam    Constitutional: Awake, alert. Well nourished appearance.    Integumentary: Warm, dry, intact. No obvious rashes.    HENT: Atraumatic, normocephalic.   Respiratory: Non labored respirations .   Cardiovascular: Intact peripheral pulses.    Psychiatric: Normal mood and affect. A&O X3    Ortho Exam  Right knee: Tenderness to palpation of medial joint line.  Mild edema.  Patella is well tracking.  Knee is stable to varus and valgus stress.  -8 degrees of knee extension and flexion to 115 degrees.  Antalgic gait with assistance of a cane.  Full plantarflexion and dorsiflexion of the ankle.  Sensation intact to light touch.  Distal neurovascular intact.    Imaging Results (Most Recent)     None           Large Joint Arthrocentesis: R knee  Date/Time: 11/14/2022 1:26 PM  Consent given by: patient  Site marked: site marked  Timeout: Immediately prior to procedure a time out was called to verify the correct patient, procedure, equipment, support staff " and site/side marked as required   Supporting Documentation  Indications: pain   Procedure Details  Location: knee - R knee  Needle gauge: 21g.  Medications administered: 40 mg triamcinolone acetonide 40 MG/ML; 5 mL lidocaine 1 %  Patient tolerance: patient tolerated the procedure well with no immediate complications              Assessment and Plan   Problem List Items Addressed This Visit    None  Visit Diagnoses     Primary osteoarthritis of right knee    -  Primary    Right knee pain, unspecified chronicity            Follow Up   Return in about 6 weeks (around 12/26/2022).  Educated on risk of smoking. Discussed options for smoking cessation.    Patient Instructions   Right knee steroid injection administered today in office. Advised on duration between injections. Can consider visco injection after 6 weeks, if needed.     Follow up in 6 weeks. No imaging. Call with questions or concerns.       Patient was given instructions and counseling regarding her condition or for health maintenance advice. Please see specific information pulled into the AVS if appropriate.

## 2023-01-04 RX ORDER — BUDESONIDE 3 MG/1
CAPSULE, COATED PELLETS ORAL
Qty: 180 CAPSULE | Refills: 0 | Status: SHIPPED | OUTPATIENT
Start: 2023-01-04 | End: 2023-02-28

## 2023-01-30 ENCOUNTER — TRANSCRIBE ORDERS (OUTPATIENT)
Dept: ADMINISTRATIVE | Facility: HOSPITAL | Age: 78
End: 2023-01-30
Payer: MEDICARE

## 2023-01-30 DIAGNOSIS — Z12.31 ENCOUNTER FOR SCREENING MAMMOGRAM FOR MALIGNANT NEOPLASM OF BREAST: Primary | ICD-10-CM

## 2023-02-28 ENCOUNTER — OFFICE VISIT (OUTPATIENT)
Dept: GASTROENTEROLOGY | Facility: CLINIC | Age: 78
End: 2023-02-28
Payer: MEDICARE

## 2023-02-28 VITALS
HEART RATE: 79 BPM | WEIGHT: 143 LBS | DIASTOLIC BLOOD PRESSURE: 61 MMHG | BODY MASS INDEX: 25.34 KG/M2 | HEIGHT: 63 IN | SYSTOLIC BLOOD PRESSURE: 112 MMHG

## 2023-02-28 DIAGNOSIS — K52.831 COLLAGENOUS COLITIS: Primary | ICD-10-CM

## 2023-02-28 PROCEDURE — 99213 OFFICE O/P EST LOW 20 MIN: CPT | Performed by: NURSE PRACTITIONER

## 2023-02-28 RX ORDER — SULFAMETHOXAZOLE AND TRIMETHOPRIM 800; 160 MG/1; MG/1
1 TABLET ORAL 2 TIMES DAILY
COMMUNITY

## 2023-02-28 NOTE — PROGRESS NOTES
Chief Complaint     Diarrhea    History of Present Illness     Salome Reeves is a 77 y.o. female who presents to [facility] for follow-up of collagenous colitis and chronic gastritis.    She reports that she's currently on her 5th antibiotic for a UTI.      She denies diarrhea.  She is continuing to take budesonide 3 mg daily.      Denies heartburn.  She is unsure if she's still taking protonix.  We discussed tapering off of this at her last visit, however she's unsure if this occurred.         History      Past Medical History:   Diagnosis Date   • Arthritis    • Bladder disorder    • Diabetes (HCC)    • Hyperlipemia    • Hypertension    • Kidney disease    • Limb swelling    • Neurologic disorder    • PONV (postoperative nausea and vomiting)    • Seasonal allergies    • Thyroid disorder    • Tremors of nervous system      Past Surgical History:   Procedure Laterality Date   • APPENDECTOMY     • CATARACT EXTRACTION     • COLONOSCOPY  2011    [facility]   • COLONOSCOPY N/A 3/31/2022    Procedure: COLONOSCOPY;  Surgeon: Little Mcgee MD;  Location: [facility] ENDOSCOPY;  Service: Gastroenterology;  Laterality: N/A;  DIVERTICULOSIS COLITIS   • ENDOSCOPY N/A 3/31/2022    Procedure: ESOPHAGOGASTRODUODENOSCOPY;  Surgeon: Little Mcgee MD;  Location: [facility] ENDOSCOPY;  Service: Gastroenterology;  Laterality: N/A;  GASTRITIS   • HYSTERECTOMY     • PARATHYROIDECTOMY  2014   • UPPER GASTROINTESTINAL ENDOSCOPY  2011    [facility]     Family History   Problem Relation Age of Onset   • Cancer Mother    • Cancer Father    • Malig Hyperthermia Neg Hx         Current Medications       Current Outpatient Medications:   •  glimepiride (AMARYL) 2 MG tablet, glimepiride 2 mg oral tablet take 1 tablet (2 mg) by oral route once daily   Active, Disp: , Rfl:   •  lisinopril (PRINIVIL,ZESTRIL) 5 MG tablet, Take 1 tablet by mouth Daily., Disp: , Rfl:   •  lovastatin (MEVACOR) 20 MG tablet, Take 1  "tablet by mouth Every Night., Disp: , Rfl:   •  metFORMIN ER (GLUCOPHAGE-XR) 500 MG 24 hr tablet, Take 2 tablets by mouth Daily. 2 tabs, Disp: , Rfl:   •  primidone (MYSOLINE) 250 MG tablet, Take 1 tablet by mouth Every Night., Disp: , Rfl:   •  propranolol XL (INNOPRAN XL) 80 MG 24 hr capsule, , Disp: , Rfl:   •  sodium bicarbonate 325 MG tablet, Take 1 tablet by mouth Daily., Disp: , Rfl:   •  sulfamethoxazole-trimethoprim (BACTRIM DS,SEPTRA DS) 800-160 MG per tablet, Take 1 tablet by mouth 2 (Two) Times a Day., Disp: , Rfl:   •  topiramate (TOPAMAX) 100 MG tablet, Take 1 tablet by mouth 2 (Two) Times a Day., Disp: , Rfl:      Allergies     Allergies   Allergen Reactions   • Adhesive Tape Other (See Comments)     BLISTERS,SKIN BURN; \"PAPER TAPE IS FINE\"     • Amoxicillin-Pot Clavulanate Nausea Only     NAUSEA AND SEVERE DIARRHEA     • Iodine Rash     BLISTERS       Social History       Social History     Social History Narrative   • Not on file         Objective       /61 (BP Location: Left arm, Patient Position: Sitting, Cuff Size: Adult)   Pulse 79   Ht 160 cm (63\")   Wt 64.9 kg (143 lb)   BMI 25.33 kg/m²       Physical Exam    Results       Result Review :    The following data was reviewed by: VERÓNICA Srinivasan on 02/28/2023:    CBC w/diff    CBC w/Diff 3/14/22 4/7/22 5/9/22   WBC 7.21 8.71 7.73   RBC 3.79 3.85 4.20   Hemoglobin 12.8 13.0 14.4   Hematocrit 38.3 40.5 43.1   .1 (A) 105.2 (A) 102.6 (A)   MCH 33.8 (A) 33.8 (A) 34.3 (A)   MCHC 33.4 32.1 33.4   RDW 12.4 14.0 12.6   Platelets 276 196 233   Neutrophil Rel % 69.6 74.5 69.6   Immature Granulocyte Rel % 0.4 0.3 0.4   Lymphocyte Rel % 18.2 (A) 17.3 (A) 22.3   Monocyte Rel % 7.1 5.4 5.4   Eosinophil Rel % 4.3 2.2 1.7   Basophil Rel % 0.4 0.3 0.6   (A) Abnormal value            CMP    CMP 3/14/22 4/7/22 5/9/22   Glucose 87 130 (A) 107 (A)   BUN 21 33 (A) 32 (A)   Creatinine 1.07 (A) 1.39 (A) 1.13 (A)   eGFR 54.3 (A) 39.7 (A) 50.8 " (A)   Sodium 138 138 139   Potassium 3.7 5.0 4.5   Chloride 101 102 103   Calcium 9.2 10.0 10.3   Total Protein  7.4    Albumin 4.40 4.50 4.40   Globulin  2.9    Total Bilirubin  0.2    Alkaline Phosphatase  60    AST (SGOT)  17    ALT (SGPT)  11    Albumin/Globulin Ratio  1.6    BUN/Creatinine Ratio 19.6 23.7 28.3 (A)   Anion Gap 15.0 15.0 13.2   (A) Abnormal value       Comments are available for some flowsheets but are not being displayed.           11/4/2022 CBC: Hemoglobin 13.2, hematocrit 40.7, platelets 235.             Assessment and Plan              Diagnoses and all orders for this visit:    1. Collagenous colitis (Primary)    Recommend that patient taper off of budesonide by taking every other day x 2 weeks.  If no diarrhea, okay to discontinue.        I spent 20 minutes caring for Salome on this date of service. This time includes time spent by me in the following activities:preparing for the visit, reviewing tests, counseling and educating the patient/family/caregiver and documenting information in the medical record  Follow Up     Follow Up   Return if symptoms worsen or fail to improve.  Patient was given instructions and counseling regarding her condition or for health maintenance advice. Please see specific information pulled into the AVS if appropriate.

## 2023-03-27 ENCOUNTER — TELEPHONE (OUTPATIENT)
Dept: GASTROENTEROLOGY | Facility: CLINIC | Age: 78
End: 2023-03-27
Payer: MEDICARE

## 2023-03-27 NOTE — TELEPHONE ENCOUNTER
Patient called and left a vm stating she has had diarrhea x3 times since midnight.   Attempted to contact pt. Left a vm requesting a return call.

## 2023-03-28 NOTE — TELEPHONE ENCOUNTER
Patient was told to call if her diarrhea returns and she said in the past week it has, she said it is only a few times a day but it has returned.

## 2023-03-29 RX ORDER — BUDESONIDE 3 MG/1
CAPSULE, COATED PELLETS ORAL
Qty: 180 CAPSULE | Refills: 0 | Status: SHIPPED | OUTPATIENT
Start: 2023-03-29

## 2023-03-29 NOTE — TELEPHONE ENCOUNTER
Patient requesting a refill of budesonide in reference to previous encounter of increase to 2x daily.

## 2023-04-18 ENCOUNTER — TRANSCRIBE ORDERS (OUTPATIENT)
Dept: ADMINISTRATIVE | Facility: HOSPITAL | Age: 78
End: 2023-04-18
Payer: MEDICARE

## 2023-04-18 DIAGNOSIS — R01.1 HEART MURMUR: Primary | ICD-10-CM

## 2023-04-25 RX ORDER — PANTOPRAZOLE SODIUM 20 MG/1
20 TABLET, DELAYED RELEASE ORAL DAILY
Qty: 90 TABLET | Refills: 1 | Status: SHIPPED | OUTPATIENT
Start: 2023-04-25

## 2023-04-25 NOTE — TELEPHONE ENCOUNTER
Patient requesting a refill of pantoprazole, order pended.  Last o/v-2/28/23  Last refill-9/14/23  Next o/v-none

## 2023-08-17 ENCOUNTER — OFFICE VISIT (OUTPATIENT)
Dept: UROLOGY | Facility: CLINIC | Age: 78
End: 2023-08-17
Payer: MEDICARE

## 2023-08-17 VITALS — WEIGHT: 143 LBS | HEIGHT: 63 IN | BODY MASS INDEX: 25.34 KG/M2 | RESPIRATION RATE: 16 BRPM

## 2023-08-17 DIAGNOSIS — N39.0 URINARY TRACT INFECTION WITH HEMATURIA, SITE UNSPECIFIED: Primary | ICD-10-CM

## 2023-08-17 DIAGNOSIS — R31.9 URINARY TRACT INFECTION WITH HEMATURIA, SITE UNSPECIFIED: Primary | ICD-10-CM

## 2023-08-17 PROBLEM — J30.2 SEASONAL ALLERGIC RHINITIS: Status: ACTIVE | Noted: 2023-08-17

## 2023-08-17 PROBLEM — K86.89 PANCREATIC INSUFFICIENCY: Status: ACTIVE | Noted: 2021-05-31

## 2023-08-17 PROBLEM — M19.90 ARTHRITIS: Status: ACTIVE | Noted: 2023-08-17

## 2023-08-17 PROBLEM — E83.52 HYPERCALCEMIA: Status: ACTIVE | Noted: 2021-11-15

## 2023-08-17 PROBLEM — G98.8 NEUROLOGIC DISORDER: Status: ACTIVE | Noted: 2023-08-17

## 2023-08-17 PROBLEM — E87.5 HYPERKALEMIA: Chronic | Status: ACTIVE | Noted: 2021-11-15

## 2023-08-17 PROBLEM — E66.9 OBESITY: Status: ACTIVE | Noted: 2021-11-15

## 2023-08-17 PROBLEM — I38 HEART VALVE DISORDER: Status: ACTIVE | Noted: 2023-06-05

## 2023-08-17 PROBLEM — E87.20 METABOLIC ACIDOSIS: Status: ACTIVE | Noted: 2021-11-15

## 2023-08-17 PROBLEM — I51.7 RIGHT VENTRICULAR DILATION: Status: ACTIVE | Noted: 2023-06-05

## 2023-08-17 PROBLEM — G25.0 BENIGN ESSENTIAL TREMOR: Status: ACTIVE | Noted: 2023-08-17

## 2023-08-17 PROBLEM — E11.3599 PROLIFERATIVE DIABETIC RETINOPATHY: Status: ACTIVE | Noted: 2021-06-24

## 2023-08-17 PROBLEM — I27.20 PULMONARY HYPERTENSION: Status: ACTIVE | Noted: 2023-06-05

## 2023-08-17 PROBLEM — N32.9 BLADDER DISORDER: Status: ACTIVE | Noted: 2023-08-17

## 2023-08-17 PROBLEM — M19.90 OSTEOARTHRITIS: Status: ACTIVE | Noted: 2021-11-15

## 2023-08-17 PROBLEM — E55.9 VITAMIN D DEFICIENCY: Status: ACTIVE | Noted: 2021-11-15

## 2023-08-17 PROBLEM — N18.30 STAGE 3 CHRONIC KIDNEY DISEASE: Status: ACTIVE | Noted: 2021-11-15

## 2023-08-17 PROBLEM — M79.89 LIMB SWELLING: Status: ACTIVE | Noted: 2023-08-17

## 2023-08-17 PROBLEM — N18.9 CKD (CHRONIC KIDNEY DISEASE): Status: ACTIVE | Noted: 2023-08-17

## 2023-08-17 LAB
BACTERIA UR QL AUTO: ABNORMAL /HPF
BILIRUB BLD-MCNC: NEGATIVE MG/DL
BILIRUB UR QL STRIP: NEGATIVE
CLARITY UR: ABNORMAL
CLARITY, POC: CLEAR
COLOR UR: YELLOW
COLOR UR: YELLOW
EXPIRATION DATE: ABNORMAL
GLUCOSE UR STRIP-MCNC: NEGATIVE MG/DL
GLUCOSE UR STRIP-MCNC: NEGATIVE MG/DL
HGB UR QL STRIP.AUTO: ABNORMAL
HYALINE CASTS UR QL AUTO: ABNORMAL /LPF
KETONES UR QL STRIP: NEGATIVE
KETONES UR QL: NEGATIVE
LEUKOCYTE EST, POC: ABNORMAL
LEUKOCYTE ESTERASE UR QL STRIP.AUTO: ABNORMAL
Lab: ABNORMAL
NITRITE UR QL STRIP: POSITIVE
NITRITE UR-MCNC: POSITIVE MG/ML
PH UR STRIP.AUTO: 6 [PH] (ref 5–8)
PH UR: 5 [PH] (ref 5–8)
PROT UR QL STRIP: ABNORMAL
PROT UR STRIP-MCNC: ABNORMAL MG/DL
RBC # UR STRIP: ABNORMAL /HPF
RBC # UR STRIP: ABNORMAL /UL
REF LAB TEST METHOD: ABNORMAL
SP GR UR STRIP: 1.01 (ref 1–1.03)
SP GR UR: 1.01 (ref 1–1.03)
SQUAMOUS #/AREA URNS HPF: ABNORMAL /HPF
URINE VOLUME: 0
UROBILINOGEN UR QL STRIP: ABNORMAL
UROBILINOGEN UR QL: ABNORMAL
WBC # UR STRIP: ABNORMAL /HPF

## 2023-08-17 PROCEDURE — 99203 OFFICE O/P NEW LOW 30 MIN: CPT | Performed by: NURSE PRACTITIONER

## 2023-08-17 PROCEDURE — 81003 URINALYSIS AUTO W/O SCOPE: CPT | Performed by: NURSE PRACTITIONER

## 2023-08-17 PROCEDURE — 87186 SC STD MICRODIL/AGAR DIL: CPT | Performed by: NURSE PRACTITIONER

## 2023-08-17 PROCEDURE — 87086 URINE CULTURE/COLONY COUNT: CPT | Performed by: NURSE PRACTITIONER

## 2023-08-17 PROCEDURE — 87077 CULTURE AEROBIC IDENTIFY: CPT | Performed by: NURSE PRACTITIONER

## 2023-08-17 PROCEDURE — 1159F MED LIST DOCD IN RCRD: CPT | Performed by: NURSE PRACTITIONER

## 2023-08-17 PROCEDURE — 1160F RVW MEDS BY RX/DR IN RCRD: CPT | Performed by: NURSE PRACTITIONER

## 2023-08-17 PROCEDURE — 81001 URINALYSIS AUTO W/SCOPE: CPT | Performed by: NURSE PRACTITIONER

## 2023-08-17 RX ORDER — LOPERAMIDE HYDROCHLORIDE 2 MG/1
2 CAPSULE ORAL
COMMUNITY

## 2023-08-17 RX ORDER — PROPRANOLOL HYDROCHLORIDE 80 MG/1
CAPSULE, EXTENDED RELEASE ORAL
COMMUNITY
Start: 2023-03-26

## 2023-08-17 RX ORDER — SACCHAROMYCES BOULARDII 250 MG
250 CAPSULE ORAL
COMMUNITY
Start: 2023-07-14

## 2023-08-17 RX ORDER — ESTRADIOL 0.1 MG/G
1 CREAM VAGINAL 3 TIMES WEEKLY
Qty: 42.5 G | Refills: 6 | Status: SHIPPED | OUTPATIENT
Start: 2023-08-18 | End: 2023-11-16

## 2023-08-17 RX ORDER — HYDROCHLOROTHIAZIDE 12.5 MG/1
TABLET ORAL
COMMUNITY
Start: 2023-07-17

## 2023-08-17 RX ORDER — SODIUM ZIRCONIUM CYCLOSILICATE 10 G/10G
10 POWDER, FOR SUSPENSION ORAL DAILY
COMMUNITY
Start: 2023-04-30

## 2023-08-17 RX ORDER — PHENAZOPYRIDINE HYDROCHLORIDE 100 MG/1
100 TABLET, FILM COATED ORAL
COMMUNITY
Start: 2023-06-14

## 2023-08-17 NOTE — PROGRESS NOTES
Chief Complaint: Urinary Tract Infection    Subjective         History of Present Illness  Salome Reeves is a 77 y.o. female presents to [facility] to be seen for recurrent UTIs.    She has been dealing with recurrent UTIs for the last 8 months.     She states that she is with symptoms of urgency and frequency with pain upon voiding and worsening leakage.    She reports that she is better on antibiotics and when she stops them then a few weeks later she is having symptoms again.    She drinks 2 cups of coffee a day and 32 oz of water a day.     She had a hysterectomy in 1989.    She states no issues with vaginal dryness or itching.        Urine cultures:     7/26/2023 less than 10,000 colony-forming units per milliliter mixed urogenital bebe    7/14/2023 E. coli 50,000 200,000 colony-forming units per milliliter pansensitive.    6/14/2023 E. coli greater than 100,000 colony-forming units per milliliter resistant to ciprofloxacin, levofloxacin    5/9/2023 Klebsiella oxytoca resistant to ampicillin, ciprofloxacin, levofloxacin, Bactrim    4/7/2023 E. coli and Klebsiella pneumoniae greater than 100,000 colony-forming units per milliliter resistant to ciprofloxacin, levofloxacin, ampicillin, intermediate susceptibility to nitrofurantoin.    1/11/2023 Pseudomonas aeruginosa greater than 100,000 colony-forming units per milliliter pansensitive    11/7/72 E. coli greater than 100,000 coliform units per milliliter resistant to ciprofloxacin, levofloxacin    4/7/2022 E. coli greater than 100,000 colony-forming's per milliliter resistant to levofloxacin    Objective     Past Medical History:   Diagnosis Date    Arthritis     Bladder disorder     Diabetes     Hyperlipemia     Hypertension     Kidney disease     Limb swelling     Neurologic disorder     PONV (postoperative nausea and vomiting)     Seasonal allergies     Thyroid disorder     Tremors of nervous system        Past Surgical History:    Procedure Laterality Date    APPENDECTOMY      CATARACT EXTRACTION      COLONOSCOPY  2011    [facility]    COLONOSCOPY N/A 3/31/2022    Procedure: COLONOSCOPY;  Surgeon: Little Mcgee MD;  Location: [facility] ENDOSCOPY;  Service: Gastroenterology;  Laterality: N/A;  DIVERTICULOSIS COLITIS    ENDOSCOPY N/A 3/31/2022    Procedure: ESOPHAGOGASTRODUODENOSCOPY;  Surgeon: Little Mcgee MD;  Location: [facility] ENDOSCOPY;  Service: Gastroenterology;  Laterality: N/A;  GASTRITIS    HYSTERECTOMY      PARATHYROIDECTOMY  2014    UPPER GASTROINTESTINAL ENDOSCOPY  2011    [facility]         Current Outpatient Medications:     Aspirin Buf,CaCarb-MgCarb-MgO, 81 MG tablet, Take 1 tablet by mouth Daily., Disp: , Rfl:     Budesonide (ENTOCORT EC) 3 MG 24 hr capsule, TAKE THREE CAPSULES BY MOUTH EVERY MORNING FOR 30 DAYS, THEN TAKE TWO CAPSULES BY MOUTH EVERY MORNING FOR 30 DAYS, THEN TAKE ONE CAPSULE BY MOUTH EVERY MORNING FOR 30 DAYS, Disp: 180 capsule, Rfl: 0    glimepiride (AMARYL) 2 MG tablet, glimepiride 2 mg oral tablet take 1 tablet (2 mg) by oral route once daily   Active, Disp: , Rfl:     hydroCHLOROthiazide (HYDRODIURIL) 12.5 MG tablet, , Disp: , Rfl:     lisinopril (PRINIVIL,ZESTRIL) 5 MG tablet, Take 1 tablet by mouth Daily., Disp: , Rfl:     Lokelma 10 g pack, Take 10 g by mouth Daily., Disp: , Rfl:     loperamide (IMODIUM) 2 MG capsule, Take 1 capsule by mouth., Disp: , Rfl:     lovastatin (MEVACOR) 20 MG tablet, Take 1 tablet by mouth Every Night., Disp: , Rfl:     metFORMIN ER (GLUCOPHAGE-XR) 500 MG 24 hr tablet, Take 2 tablets by mouth Daily. 2 tabs, Disp: , Rfl:     pantoprazole (PROTONIX) 20 MG EC tablet, Take 1 tablet by mouth Daily., Disp: 90 tablet, Rfl: 1    phenazopyridine (PYRIDIUM) 100 MG tablet, Take 1 tablet by mouth., Disp: , Rfl:     primidone (MYSOLINE) 250 MG tablet, Take 1 tablet by mouth Every Night., Disp: , Rfl:     propranolol LA (INDERAL LA) 80 MG 24 hr capsule, , Disp: , Rfl:     saccharomyces  "boulardii (FLORASTOR) 250 MG capsule, Take 1 capsule by mouth., Disp: , Rfl:     sodium bicarbonate 325 MG tablet, Take 1 tablet by mouth Daily., Disp: , Rfl:     topiramate (TOPAMAX) 100 MG tablet, Take 1 tablet by mouth 2 (Two) Times a Day., Disp: , Rfl:     [START ON 8/18/2023] estradiol (ESTRACE) 0.1 MG/GM vaginal cream, Insert 1 g into the vagina 3 (Three) Times a Week for 90 days., Disp: 42.5 g, Rfl: 6    Allergies   Allergen Reactions    Adhesive Tape Other (See Comments)     BLISTERS,SKIN BURN; \"PAPER TAPE IS FINE\"      Amoxicillin-Pot Clavulanate Nausea Only     NAUSEA AND SEVERE DIARRHEA      Iodine Rash     BLISTERS        Family History   Problem Relation Age of Onset    Cancer Mother     Cancer Father     Malig Hyperthermia Neg Hx        Social History     Socioeconomic History    Marital status:    Tobacco Use    Smoking status: Never    Smokeless tobacco: Never   Vaping Use    Vaping Use: Never used   Substance and Sexual Activity    Alcohol use: Never    Drug use: Never    Sexual activity: Defer       Vital Signs:   Resp 16   Ht 160 cm (63\")   Wt 64.9 kg (143 lb)   BMI 25.33 kg/mý      Physical Exam     Result Review :   The following data was reviewed by: VERÓNICA Norman on 08/17/2023:  Results for orders placed or performed in visit on 08/17/23   Bladder Scan   Result Value Ref Range    Urine Volume 0    POC Urinalysis Dipstick, Automated    Specimen: Urine   Result Value Ref Range    Color Yellow Yellow, Straw, Dark Yellow, Smita    Clarity, UA Clear Clear    Specific Gravity  1.015 1.005 - 1.030    pH, Urine 5.0 5.0 - 8.0    Leukocytes Moderate (2+) (A) Negative    Nitrite, UA Positive (A) Negative    Protein, POC 30 mg/dL (A) Negative mg/dL    Glucose, UA Negative Negative mg/dL    Ketones, UA Negative Negative    Urobilinogen, UA 0.2 E.U./dL Normal, 0.2 E.U./dL    Bilirubin Negative Negative    Blood, UA Moderate (A) Negative    Lot Number [id number]     Expiration Date 7/2,024  "            Procedures        Assessment and Plan    Diagnoses and all orders for this visit:    1. Urinary tract infection with hematuria, site unspecified (Primary)  -     POC Urinalysis Dipstick, Automated  -     Urine Culture - Urine, Urine, Clean Catch; Future  -     Urinalysis With Microscopic - Urine, Clean Catch; Future  -     Urinalysis With Microscopic - Urine, Clean Catch  -     Urine Culture - Urine, Urine, Clean Catch  -     Bladder Scan  -     estradiol (ESTRACE) 0.1 MG/GM vaginal cream; Insert 1 g into the vagina 3 (Three) Times a Week for 90 days.  Dispense: 42.5 g; Refill: 6      Urinary tract infection-no evidence on urine dip of infection today.  Discussed with patient that chronic recurrent UTI is a common condition that is multifactorial in nature, difficult to treat, unlikely to completely irradicate, and the specific cause for recurrent infections is often unknown.     Discussed that urine cultures are critically important for the diagnosis of recurrent urinary tract infection.  Discussed that some women with history of vaginal atrophy will have concomitant genitourinary syndrome of menopause, cystitis symptoms, without bacteriuria.     Discussed that one of the best treatment and prevention medications for recurrent urinary tract infection and  syndrome of menopause is vaginal estrogen cream.  Patient tolerating Intrarosa; Premarin cream sent to pharmacy.  Specific instructions provided regarding placement of cream at the urethra and urethral meatus 3 times weekly.  Blackbox warning discussed.  Sent to pharmacy     Should antibiotic strategy for treatment and prevention of recurrent urinary tract infection be pursued, aware that trend of urine cultures needs to be performed.  She is agreeable and understands this.     Encouraged behavioral modifications including fluid management, hydration, not delaying urgency when she needs to void.  Recommend cranberry supplementation daily to aid with  prevention of urinary tract infection.  Discussed over-the-counter herbal supplements for prevention of UTI including [place], pre-life, and Desert harvest.  Patient provided information on these products and encouraged to investigate further.     At this point, recommend the above abx when culture positive  Discussed the importance of antibiotic stewardship; the prevalence of resistant bacteria; and adverse effects of prolonged antibiotic treatment including yeast infections and possible C. difficile colitis; thus would recommend continued use of on-demand antibiotics per culture sensitivities.  Provided specimen cups, patient to notify office as UTI symptoms arise so culture may be obtained.  Patient to take Pyridium while awaiting culture result and aware that antibiotic will only be ordered if urine culture positive.           I spent 30 minutes caring for Salome on this date of service. This time includes time spent by me in the following activities:reviewing tests, obtaining and/or reviewing a separately obtained history, performing a medically appropriate examination and/or evaluation , counseling and educating the patient/family/caregiver, ordering medications, tests, or procedures, and documenting information in the medical record  Follow Up   Return in about 3 months (around 11/17/2023) for f/u UTis.  Patient was given instructions and counseling regarding her condition or for health maintenance advice. Please see specific information pulled into the AVS if appropriate.         This document has been electronically signed by VERÓNICA Norman  August 17, 2023 15:30 EDT

## 2023-08-19 ENCOUNTER — DOCUMENTATION (OUTPATIENT)
Dept: UROLOGY | Facility: CLINIC | Age: 78
End: 2023-08-19
Payer: MEDICARE

## 2023-08-19 DIAGNOSIS — R31.9 URINARY TRACT INFECTION WITH HEMATURIA, SITE UNSPECIFIED: Primary | ICD-10-CM

## 2023-08-19 DIAGNOSIS — N39.0 URINARY TRACT INFECTION WITH HEMATURIA, SITE UNSPECIFIED: Primary | ICD-10-CM

## 2023-08-19 LAB — BACTERIA SPEC AEROBE CULT: ABNORMAL

## 2023-08-19 RX ORDER — SULFAMETHOXAZOLE AND TRIMETHOPRIM 800; 160 MG/1; MG/1
1 TABLET ORAL 2 TIMES DAILY
Qty: 20 TABLET | Refills: 0 | Status: SHIPPED | OUTPATIENT
Start: 2023-08-19 | End: 2023-08-29

## 2023-08-21 ENCOUNTER — TELEPHONE (OUTPATIENT)
Dept: UROLOGY | Facility: CLINIC | Age: 78
End: 2023-08-21
Payer: MEDICARE

## 2023-08-21 NOTE — TELEPHONE ENCOUNTER
Pt is aware and states she is having diarrhea from antibiotics. I suggested taking a probiotic along with it and pt stated that she is already doing that. I sent Shira a message to make her aware.

## 2023-08-21 NOTE — TELEPHONE ENCOUNTER
----- Message from VERÓNICA Norman sent at 8/21/2023  8:13 AM EDT -----  Please inform of positive cx I sent in meds over the weekend for her

## 2023-09-13 ENCOUNTER — LAB (OUTPATIENT)
Dept: LAB | Facility: HOSPITAL | Age: 78
End: 2023-09-13
Payer: MEDICARE

## 2023-09-13 ENCOUNTER — TELEPHONE (OUTPATIENT)
Dept: UROLOGY | Facility: CLINIC | Age: 78
End: 2023-09-13
Payer: MEDICARE

## 2023-09-13 DIAGNOSIS — N39.0 URINARY TRACT INFECTION WITH HEMATURIA, SITE UNSPECIFIED: Primary | ICD-10-CM

## 2023-09-13 DIAGNOSIS — R31.9 URINARY TRACT INFECTION WITH HEMATURIA, SITE UNSPECIFIED: ICD-10-CM

## 2023-09-13 DIAGNOSIS — R31.9 URINARY TRACT INFECTION WITH HEMATURIA, SITE UNSPECIFIED: Primary | ICD-10-CM

## 2023-09-13 DIAGNOSIS — N39.0 URINARY TRACT INFECTION WITH HEMATURIA, SITE UNSPECIFIED: ICD-10-CM

## 2023-09-13 LAB

## 2023-09-13 PROCEDURE — 87077 CULTURE AEROBIC IDENTIFY: CPT

## 2023-09-13 PROCEDURE — 87086 URINE CULTURE/COLONY COUNT: CPT

## 2023-09-13 PROCEDURE — 81001 URINALYSIS AUTO W/SCOPE: CPT

## 2023-09-13 PROCEDURE — 87186 SC STD MICRODIL/AGAR DIL: CPT

## 2023-09-13 RX ORDER — PHENAZOPYRIDINE HYDROCHLORIDE 100 MG/1
100 TABLET, FILM COATED ORAL 3 TIMES DAILY PRN
Qty: 20 TABLET | Refills: 0 | Status: SHIPPED | OUTPATIENT
Start: 2023-09-13

## 2023-09-13 NOTE — TELEPHONE ENCOUNTER
I CALLED THE PATIENT AND RELAYED THE MESSAGE, PER SABA.      PATIENT DOES NOT NEED A PRESCRIPTION FOR AZO.  SHE CAN GET THAT.    SHE IS AWARE THE ORDER FOR UA AND CULTURE ARE PLACE.

## 2023-09-13 NOTE — TELEPHONE ENCOUNTER
PATIENT CALLED AND SAID SHE HAS URINARY FREQUENCY.  NOT A LOT OF BURNING YET.  SHE THINKS SHE HAS A UTI.    SHE SAID SABA TOLD HER ON 08/17/23, WHEN SHE WAS SEEN, TO CALL IF SHE THINKS SHE HAS A UTI, SO SABA CAN SEND A PRESCRIPTION FOR HER TO [address]   SHE SAID SHE TOLD HER THERE WAS NO REASON SHE WOULD HAVE TO COME IN, AND TO JUST CALL.

## 2023-09-13 NOTE — TELEPHONE ENCOUNTER
He needs a urine culture and a urinalysis first for we prescribe medication unless she is speaking of Azo and I can place an order for that I will put orders in for a urinalysis and culture right now she can go and get that done at any outpatient lab.

## 2023-09-15 LAB — BACTERIA SPEC AEROBE CULT: ABNORMAL

## 2023-09-15 RX ORDER — SULFAMETHOXAZOLE AND TRIMETHOPRIM 800; 160 MG/1; MG/1
1 TABLET ORAL 2 TIMES DAILY
Qty: 20 TABLET | Refills: 0 | Status: SHIPPED | OUTPATIENT
Start: 2023-09-15 | End: 2023-09-25

## 2023-10-02 ENCOUNTER — TELEPHONE (OUTPATIENT)
Dept: UROLOGY | Facility: CLINIC | Age: 78
End: 2023-10-02
Payer: MEDICARE

## 2023-10-02 ENCOUNTER — LAB (OUTPATIENT)
Dept: LAB | Facility: HOSPITAL | Age: 78
End: 2023-10-02
Payer: MEDICARE

## 2023-10-02 DIAGNOSIS — R31.9 URINARY TRACT INFECTION WITH HEMATURIA, SITE UNSPECIFIED: Primary | ICD-10-CM

## 2023-10-02 DIAGNOSIS — R30.0 DYSURIA: ICD-10-CM

## 2023-10-02 DIAGNOSIS — R35.0 FREQUENCY OF URINATION: ICD-10-CM

## 2023-10-02 DIAGNOSIS — N39.0 URINARY TRACT INFECTION WITH HEMATURIA, SITE UNSPECIFIED: Primary | ICD-10-CM

## 2023-10-02 PROCEDURE — 87077 CULTURE AEROBIC IDENTIFY: CPT | Performed by: NURSE PRACTITIONER

## 2023-10-02 PROCEDURE — 87186 SC STD MICRODIL/AGAR DIL: CPT | Performed by: NURSE PRACTITIONER

## 2023-10-02 PROCEDURE — 87086 URINE CULTURE/COLONY COUNT: CPT | Performed by: NURSE PRACTITIONER

## 2023-10-02 NOTE — TELEPHONE ENCOUNTER
Pt returned my call.  Pt will go today to have UA and Culture done at  Lab.  This order was placed under Marta Foy's name.  Shira is out of the office this week.  Pt is of understand.

## 2023-10-04 ENCOUNTER — TELEPHONE (OUTPATIENT)
Dept: UROLOGY | Facility: CLINIC | Age: 78
End: 2023-10-04
Payer: MEDICARE

## 2023-10-04 DIAGNOSIS — N39.0 URINARY TRACT INFECTION IN FEMALE: Primary | ICD-10-CM

## 2023-10-04 LAB — BACTERIA SPEC AEROBE CULT: ABNORMAL

## 2023-10-04 RX ORDER — NITROFURANTOIN 25; 75 MG/1; MG/1
100 CAPSULE ORAL 2 TIMES DAILY
Qty: 14 CAPSULE | Refills: 0 | Status: SHIPPED | OUTPATIENT
Start: 2023-10-04 | End: 2023-10-11

## 2023-10-04 NOTE — TELEPHONE ENCOUNTER
Patient called in while I was leaving her a VM and I let her know that her urine culture was positive for bacteria and that an antibiotic was sent into her pharmacy. Patient verbalized understanding.

## 2023-10-04 NOTE — PROGRESS NOTES
Please advise patient that her urine culture was positive for bacteria. I have sent nitrofurantoin to her pharmacy.

## 2023-10-10 ENCOUNTER — TELEPHONE (OUTPATIENT)
Dept: UROLOGY | Facility: CLINIC | Age: 78
End: 2023-10-10
Payer: MEDICARE

## 2023-10-10 NOTE — TELEPHONE ENCOUNTER
PT WAS STARTED ON AN ANTIBIOTIC ON 10/4 FOR UTI. SHE TAKES HER LAST DOSE TONIGHT AND IS STILL HAVING PRESSURE AND PAIN WITH URINATION. DESCRIBES IT AS MORE PAIN THAN BURNING. ASKING IF SHE SHOULD TEST AGAIN OR HAVE MORE ANTIBIOTIC.

## 2023-10-10 NOTE — TELEPHONE ENCOUNTER
The patient will need to wait at least a week after she completes her antibiotic before we can do any further testing I would not recommend any further antibiotics at this time or ordering a urine culture as it will be negative given she is on an antibiotic

## 2023-10-16 DIAGNOSIS — R30.0 DYSURIA: Primary | ICD-10-CM

## 2023-10-16 DIAGNOSIS — R35.0 FREQUENCY OF URINATION: ICD-10-CM

## 2023-10-16 NOTE — TELEPHONE ENCOUNTER
Pt called back to report that she is still having painful urination and s/s uti after completing the antibiotics.

## 2023-10-16 NOTE — TELEPHONE ENCOUNTER
Called and spoke to pt to notify her that orders have been placed for a urine sample to check UA and culture. Informed pt that she can go to a [facility] lab to have this performed. Pt anticipates that she will be able to have this done tomorrow. Informed pt that we will need to wait for culture results and this can take a few days. Pt verbalized understanding. Orders for UA w/ Micro and urine culture placed. OLGA ATKINS

## 2023-10-17 ENCOUNTER — LAB (OUTPATIENT)
Dept: LAB | Facility: HOSPITAL | Age: 78
End: 2023-10-17
Payer: MEDICARE

## 2023-10-17 DIAGNOSIS — R30.0 DYSURIA: ICD-10-CM

## 2023-10-17 DIAGNOSIS — R35.0 FREQUENCY OF URINATION: ICD-10-CM

## 2023-10-17 LAB
BACTERIA UR QL AUTO: ABNORMAL /HPF
BILIRUB UR QL STRIP: NEGATIVE
CLARITY UR: CLEAR
COLOR UR: YELLOW
GLUCOSE UR STRIP-MCNC: NEGATIVE MG/DL
HGB UR QL STRIP.AUTO: NEGATIVE
HYALINE CASTS UR QL AUTO: ABNORMAL /LPF
KETONES UR QL STRIP: NEGATIVE
LEUKOCYTE ESTERASE UR QL STRIP.AUTO: ABNORMAL
NITRITE UR QL STRIP: NEGATIVE
PH UR STRIP.AUTO: 6.5 [PH] (ref 5–8)
PROT UR QL STRIP: ABNORMAL
RBC # UR STRIP: ABNORMAL /HPF
REF LAB TEST METHOD: ABNORMAL
SP GR UR STRIP: 1.02 (ref 1–1.03)
SQUAMOUS #/AREA URNS HPF: ABNORMAL /HPF
UROBILINOGEN UR QL STRIP: ABNORMAL
WBC # UR STRIP: ABNORMAL /HPF

## 2023-10-17 PROCEDURE — 81001 URINALYSIS AUTO W/SCOPE: CPT

## 2023-10-17 PROCEDURE — 87086 URINE CULTURE/COLONY COUNT: CPT

## 2023-10-18 LAB
BACTERIA SPEC AEROBE CULT: ABNORMAL
BACTERIA SPEC AEROBE CULT: ABNORMAL

## 2023-10-19 ENCOUNTER — OFFICE VISIT (OUTPATIENT)
Dept: UROLOGY | Facility: CLINIC | Age: 78
End: 2023-10-19
Payer: MEDICARE

## 2023-10-19 DIAGNOSIS — R30.0 DYSURIA: Primary | ICD-10-CM

## 2023-10-19 LAB

## 2023-10-19 PROCEDURE — 81001 URINALYSIS AUTO W/SCOPE: CPT | Performed by: NURSE PRACTITIONER

## 2023-10-19 PROCEDURE — 87086 URINE CULTURE/COLONY COUNT: CPT | Performed by: NURSE PRACTITIONER

## 2023-10-19 RX ORDER — PANTOPRAZOLE SODIUM 20 MG/1
20 TABLET, DELAYED RELEASE ORAL DAILY
Qty: 90 TABLET | Refills: 1 | Status: SHIPPED | OUTPATIENT
Start: 2023-10-19

## 2023-10-19 NOTE — TELEPHONE ENCOUNTER
Pt is requesting pantoprazole. Order pended.   Last ov: 2/28/23  Next ov: none  Last refill: 4/25/23

## 2023-10-19 NOTE — PROGRESS NOTES
Procedure   Insert Temp Indwelling Blad Cath, Comp    Date/Time: 10/19/2023 10:22 AM    Performed by: Brook Holcomb RN  Authorized by: Shira Thomson APRN  Preparation: Patient was prepped and draped in the usual sterile fashion.  Local anesthesia used: no    Anesthesia:  Local anesthesia used: no    Sedation:  Patient sedated: no    Patient tolerance: patient tolerated the procedure well with no immediate complications  Comments: Patient presented to the office to collect a catheter urine specimen.  Patient in lithotomy position with drape.  Cleansed genitalia in sterile fashion and inserted 14fr straight catheter. Obtained immediate return of yellow, clear urine. Drained bladder, removed catheter.

## 2023-10-20 ENCOUNTER — TELEPHONE (OUTPATIENT)
Dept: UROLOGY | Facility: CLINIC | Age: 78
End: 2023-10-20
Payer: MEDICARE

## 2023-10-20 LAB — BACTERIA SPEC AEROBE CULT: NO GROWTH

## 2023-10-20 NOTE — TELEPHONE ENCOUNTER
Called and spoke with pt after verifying name and birthday with epic. Called to inform pt that her urine culture was negative, but Dr. Ferrell wants her to come to the office today to  samples for Urogesic Blue to treat her symptoms. Instructed pt that this medication will turn her urine blue and that she can take one pill up to 4 times a day as needed for urinary tract symptoms. Pt verbalized understanding. Rescheduled pt's follow up appt with Shira for 11/7/23 so that she can follow up sooner. Also instructed pt that if her symptoms persist to call and notify the office. Pt verbalized understanding and stated that her  would come to the office today to  the medication samples and her appointment reminder.

## 2023-10-20 NOTE — TELEPHONE ENCOUNTER
Called and spoke with pt about UTI symptoms that she is having. Pt stated that she did not sleep at all last night due to having frequency. Pt also reports urgency and burning with urination. Informed pt that Shira would usually not prescribe an antibiotic until the urine culture comes back and suggested that pt take AZO until culture comes back. Pt states that AZO gives her diarrhea. Informed pt that I would talk to a provider and return call.

## 2023-10-23 ENCOUNTER — TELEPHONE (OUTPATIENT)
Dept: UROLOGY | Facility: CLINIC | Age: 78
End: 2023-10-23
Payer: MEDICARE

## 2023-10-23 DIAGNOSIS — R35.0 FREQUENCY OF URINATION: Primary | ICD-10-CM

## 2023-10-23 NOTE — TELEPHONE ENCOUNTER
----- Message from VERÓNICA Norman sent at 10/23/2023  9:15 AM EDT -----  Inform patient that her urine culture was negative.

## 2023-10-23 NOTE — TELEPHONE ENCOUNTER
Spoke to pt.  She has not been using the Estrace cream.  She will restart taking the Estrace.  Pt has agreed to take Myrbetriq.  I will get samples ready for her to  in the [place] office.  Pt will keep her 11/7/23 appt with Shira.  Pt is of understanding.

## 2023-10-23 NOTE — TELEPHONE ENCOUNTER
Spoke to pt regarding results. Pt is C/O of frequency. Having to go every 15 minuets.  No other symptoms.

## 2023-10-23 NOTE — TELEPHONE ENCOUNTER
PT CALLED BACK TO REPORT THAT THE SYMPTOMS HAVE NOT RESOLVED. SHE IS STILL USING THE BATHROOM ABOUT EVERY 15 MINUTES. SHE IS ASKING FOR A CALL BACK, SHE NEEDS TO RESOLVE THIS, SHE HAS AN APPT IN [place] TOMORROW.

## 2023-10-24 RX ORDER — BUDESONIDE 3 MG/1
3 CAPSULE, COATED PELLETS ORAL EVERY MORNING
Qty: 180 CAPSULE | Refills: 0 | Status: SHIPPED | OUTPATIENT
Start: 2023-10-24

## 2023-10-24 NOTE — TELEPHONE ENCOUNTER
Patient is requesting a refill of Budesonide, order pended.  Last o/v-2/28/23  Last refill-6/26/23  Next o/v- none

## 2023-10-31 ENCOUNTER — TELEPHONE (OUTPATIENT)
Dept: UROLOGY | Facility: CLINIC | Age: 78
End: 2023-10-31
Payer: MEDICARE

## 2023-10-31 NOTE — TELEPHONE ENCOUNTER
PATIENT CALLED AND SAID ABOUT A WEEK AGO, HER  CAME TO THE OFFICE AND PICKED UP SAMPLES FOR HER, OF A MEDICATION FOR BURNING.  SHE SAID THE PILLS ARE RED AND IN A PUNCH PACK.  THEY DO NOT MAKE HER URINE ORANGE.  SHE CANNOT SEE THE PACKAGING TO TELL ME THE NAME OF IT.    SHE SAID IT IS NOT HELPING AT ALL FOR THE BURNING.

## 2023-10-31 NOTE — TELEPHONE ENCOUNTER
I CALLED THE PATIENT.  I TOLD HER THE MEDICATION SHE WAS PRESCRIBED WAS FOR URGENCY AND FREQUENCY, AND NOT BURNING.    SHE IS USING THE ESTRACE AND SHE SAID SHE IS TAKING MYRBETRIQ.  SHE COULD MAKE OUT PART OF THE LETTERS ON IT.  SHE CANNOT SEE WELL.  SHE CANNOT TAKE AZO, AS IT GIVES HER DIARRHEA.      I ASKED HER IF SHE WAS TAKING A SAMPLE OF ANYTHING THAT MAKES HER URINE BLUE.  SHE SAID SHE WAS NOT.  SHE WAS ON THEM FOR A WHILE.  FROM A PHONE ENCOUNTER ON 10/20/23, HER  WAS SUPPOSED TO COME  UROGESIC BLUE THAT DR. TATE WANTED HER TO TAKE.

## 2023-11-01 ENCOUNTER — TELEPHONE (OUTPATIENT)
Dept: UROLOGY | Facility: CLINIC | Age: 78
End: 2023-11-01
Payer: MEDICARE

## 2023-11-01 DIAGNOSIS — R30.0 DYSURIA: Primary | ICD-10-CM

## 2023-11-01 RX ORDER — CLOTRIMAZOLE AND BETAMETHASONE DIPROPIONATE 10; .64 MG/G; MG/G
1 CREAM TOPICAL 2 TIMES DAILY
Qty: 15 G | Refills: 2 | Status: SHIPPED | OUTPATIENT
Start: 2023-11-01

## 2023-11-01 NOTE — TELEPHONE ENCOUNTER
Spoke with pt after verifying name and . Let pt know that she could come  more samples of urogesic blue if they worked for her the last time that they picked samples up. Pt states that this medication did not work for her. Pt instructed on how to apply estrace cream as instructed by Shira. Pt stated that she was using the applicator and that she would not use the applicator next time. Pt was placed on hold to ask Shira about the Urogesic Blue and her symptoms. Shira instructed me to tell pt that she would send in an antifungal cream to the pt's pharmacy. Instructed pt of this, verified her pharmacy is correct and instructed pt that Shira would send the prescription over today. Instructed pt to call with continued symptoms or concerns, instructed pt to take all medication as prescribed, instructed pt to keep her appt with Shira on 23 at 0930am and confirmed that she would be able to come on that day. Pt verbalized understanding.

## 2023-11-07 ENCOUNTER — OFFICE VISIT (OUTPATIENT)
Dept: UROLOGY | Facility: CLINIC | Age: 78
End: 2023-11-07
Payer: MEDICARE

## 2023-11-07 VITALS
RESPIRATION RATE: 12 BRPM | SYSTOLIC BLOOD PRESSURE: 127 MMHG | HEIGHT: 63 IN | DIASTOLIC BLOOD PRESSURE: 85 MMHG | BODY MASS INDEX: 23.85 KG/M2 | WEIGHT: 134.6 LBS | HEART RATE: 87 BPM

## 2023-11-07 DIAGNOSIS — N39.0 URINARY TRACT INFECTION IN FEMALE: Primary | ICD-10-CM

## 2023-11-07 DIAGNOSIS — N39.0 RECURRENT UTI: ICD-10-CM

## 2023-11-07 PROBLEM — R80.1 PERSISTENT PROTEINURIA: Status: ACTIVE | Noted: 2023-09-25

## 2023-11-07 PROBLEM — E11.22 TYPE 2 DIABETES MELLITUS WITH DIABETIC CHRONIC KIDNEY DISEASE: Status: ACTIVE | Noted: 2021-08-02

## 2023-11-07 LAB
BILIRUB BLD-MCNC: NEGATIVE MG/DL
CLARITY, POC: ABNORMAL
COLOR UR: YELLOW
EXPIRATION DATE: ABNORMAL
GLUCOSE UR STRIP-MCNC: NEGATIVE MG/DL
KETONES UR QL: NEGATIVE
LEUKOCYTE EST, POC: ABNORMAL
Lab: ABNORMAL
NITRITE UR-MCNC: POSITIVE MG/ML
PH UR: 5.5 [PH] (ref 5–8)
PROT UR STRIP-MCNC: ABNORMAL MG/DL
RBC # UR STRIP: ABNORMAL /UL
SP GR UR: 1.03 (ref 1–1.03)
UROBILINOGEN UR QL: ABNORMAL

## 2023-11-07 NOTE — PROGRESS NOTES
Chief Complaint: Follow-up (Pt here for follow up.  Pt is not using the Estrace cream.  Still having some pain.  No URI's since last visit. Pt had last Clotrimazol and Tetamethason Dipropionate Cream filled but did not use.)    Subjective         History of Present Illness  Salome Reeves is a 77 y.o. female presents to [facility] to be seen for f/u UTis.    The patient is still having pain with urination.    She has not used the clotrimazole cream as well     8/17/2023 E. coli greater than 100,000 colony-forming units per milliliter resistant to levofloxacin.    9/13/2023 E. coli greater than 100,000 colony-forming units per milliliter resistant to levofloxacin.    10/2/2023 Klebsiella oxytoca greater than 100,000 colony-forming's per milliliter resistant to gentamicin, tobramycin, Bactrim, intermediate susceptibility to levofloxacin.    10/17/2023 less than 10,000 colony-forming units per milliliter gram-negative bacilli 20,000 colony-forming units per milliliter Streptococcus alphahemolytic    10/20/2023 no growth    She stopped using the estrace cream last week.    She is taking the myrbetriq and this has not yet helped her urgency and frequency.      Previous:    She has been dealing with recurrent UTIs for the last 8 months.      She states that she is with symptoms of urgency and frequency with pain upon voiding and worsening leakage.     She reports that she is better on antibiotics and when she stops them then a few weeks later she is having symptoms again.     She drinks 2 cups of coffee a day and 32 oz of water a day.      She had a hysterectomy in 1989.     She states no issues with vaginal dryness or itching.           Urine cultures:      7/26/2023 less than 10,000 colony-forming units per milliliter mixed urogenital bebe     7/14/2023 E. coli 50,000 200,000 colony-forming units per milliliter pansensitive.     6/14/2023 E. coli greater than 100,000 colony-forming units  per milliliter resistant to ciprofloxacin, levofloxacin     5/9/2023 Klebsiella oxytoca resistant to ampicillin, ciprofloxacin, levofloxacin, Bactrim     4/7/2023 E. coli and Klebsiella pneumoniae greater than 100,000 colony-forming units per milliliter resistant to ciprofloxacin, levofloxacin, ampicillin, intermediate susceptibility to nitrofurantoin.     1/11/2023 Pseudomonas aeruginosa greater than 100,000 colony-forming units per milliliter pansensitive     11/7/72 E. coli greater than 100,000 coliform units per milliliter resistant to ciprofloxacin, levofloxacin     4/7/2022 E. coli greater than 100,000 colony-forming's per milliliter resistant to levofloxacin    Objective     Past Medical History:   Diagnosis Date    Arthritis     Bladder disorder     Diabetes     Hyperlipemia     Hypertension     Kidney disease     Limb swelling     Neurologic disorder     PONV (postoperative nausea and vomiting)     Seasonal allergies     Thyroid disorder     Tremors of nervous system        Past Surgical History:   Procedure Laterality Date    APPENDECTOMY      CATARACT EXTRACTION      COLONOSCOPY  2011    [facility]    COLONOSCOPY N/A 3/31/2022    Procedure: COLONOSCOPY;  Surgeon: Little Mcgee MD;  Location: [facility] ENDOSCOPY;  Service: Gastroenterology;  Laterality: N/A;  DIVERTICULOSIS COLITIS    ENDOSCOPY N/A 3/31/2022    Procedure: ESOPHAGOGASTRODUODENOSCOPY;  Surgeon: Little Mcgee MD;  Location: [facility] ENDOSCOPY;  Service: Gastroenterology;  Laterality: N/A;  GASTRITIS    HYSTERECTOMY      PARATHYROIDECTOMY  2014    UPPER GASTROINTESTINAL ENDOSCOPY  2011    [facility]         Current Outpatient Medications:     Aspirin Buf,CaCarb-MgCarb-MgO, 81 MG tablet, Take 1 tablet by mouth Daily., Disp: , Rfl:     Budesonide (ENTOCORT EC) 3 MG 24 hr capsule, Take 1 capsule by mouth Every Morning., Disp: 180 capsule, Rfl: 0    clotrimazole-betamethasone (LOTRISONE) 1-0.05 % cream, Apply 1 application  topically to the  "appropriate area as directed 2 (Two) Times a Day., Disp: 15 g, Rfl: 2    diphenhydrAMINE-acetaminophen (TYLENOL PM)  MG tablet per tablet, Take 1 tablet by mouth., Disp: , Rfl:     estradiol (ESTRACE) 0.1 MG/GM vaginal cream, Insert 1 g into the vagina 3 (Three) Times a Week for 90 days., Disp: 42.5 g, Rfl: 6    glimepiride (AMARYL) 2 MG tablet, glimepiride 2 mg oral tablet take 1 tablet (2 mg) by oral route once daily   Active, Disp: , Rfl:     hydroCHLOROthiazide (HYDRODIURIL) 12.5 MG tablet, , Disp: , Rfl:     lisinopril (PRINIVIL,ZESTRIL) 5 MG tablet, Take 1 tablet by mouth Daily., Disp: , Rfl:     Lokelma 10 g pack, Take 10 g by mouth Daily., Disp: , Rfl:     loperamide (IMODIUM) 2 MG capsule, Take 1 capsule by mouth., Disp: , Rfl:     lovastatin (MEVACOR) 20 MG tablet, Take 1 tablet by mouth Every Night., Disp: , Rfl:     metFORMIN ER (GLUCOPHAGE-XR) 500 MG 24 hr tablet, Take 2 tablets by mouth Daily. 2 tabs, Disp: , Rfl:     Mirabegron ER (Myrbetriq) 25 MG tablet sustained-release 24 hour 24 hr tablet, Take 1 tablet by mouth Daily., Disp: 56 tablet, Rfl: 0    pantoprazole (PROTONIX) 20 MG EC tablet, TAKE ONE TABLET BY MOUTH DAILY, Disp: 90 tablet, Rfl: 1    phenazopyridine (PYRIDIUM) 100 MG tablet, Take 1 tablet by mouth 3 (Three) Times a Day As Needed for Bladder Spasms., Disp: 20 tablet, Rfl: 0    primidone (MYSOLINE) 250 MG tablet, Take 1 tablet by mouth Every Night., Disp: , Rfl:     propranolol LA (INDERAL LA) 80 MG 24 hr capsule, , Disp: , Rfl:     saccharomyces boulardii (FLORASTOR) 250 MG capsule, Take 1 capsule by mouth., Disp: , Rfl:     sodium bicarbonate 325 MG tablet, Take 1 tablet by mouth Daily., Disp: , Rfl:     topiramate (TOPAMAX) 100 MG tablet, Take 1 tablet by mouth 2 (Two) Times a Day., Disp: , Rfl:     Allergies   Allergen Reactions    Adhesive Tape Other (See Comments)     BLISTERS,SKIN BURN; \"PAPER TAPE IS FINE\"      Amoxicillin-Pot Clavulanate Nausea Only     NAUSEA AND SEVERE " "DIARRHEA      Iodine Rash     BLISTERS        Family History   Problem Relation Age of Onset    Cancer Mother     Cancer Father     Malig Hyperthermia Neg Hx        Social History     Socioeconomic History    Marital status:    Tobacco Use    Smoking status: Never     Passive exposure: Never    Smokeless tobacco: Never   Vaping Use    Vaping Use: Never used   Substance and Sexual Activity    Alcohol use: Never    Drug use: Never    Sexual activity: Defer       Vital Signs:   /85 (BP Location: Left arm, Patient Position: Sitting)   Pulse 87   Resp 12   Ht 160 cm (63\")   Wt 61.1 kg (134 lb 9.6 oz)   BMI 23.84 kg/m²      Physical Exam     Result Review :   The following data was reviewed by: VERÓNICA Norman on 11/07/2023:  Results for orders placed or performed in visit on 11/07/23   POC Urinalysis Dipstick, Automated    Specimen: Urine   Result Value Ref Range    Color Yellow Yellow, Straw, Dark Yellow, Smita    Clarity, UA Slightly Cloudy (A) Clear    Specific Gravity  1.030 1.005 - 1.030    pH, Urine 5.5 5.0 - 8.0    Leukocytes Small (1+) (A) Negative    Nitrite, UA Positive (A) Negative    Protein,  mg/dL (A) Negative mg/dL    Glucose, UA Negative Negative mg/dL    Ketones, UA Negative Negative    Urobilinogen, UA 0.2 E.U./dL Normal, 0.2 E.U./dL    Bilirubin Negative Negative    Blood, UA Small (A) Negative    Lot Number [id number]     Expiration Date 2,024/9             Procedures        Assessment and Plan    Diagnoses and all orders for this visit:    1. Urinary tract infection in female (Primary)  -     POC Urinalysis Dipstick, Automated    2. Recurrent UTI  -     CT Abdomen Pelvis With & Without Contrast; Future  -     Cystoscopy; Future        Given patient's recurrent UTIs that are refractory to anything that we have done thus far and her symptoms that continue we will get her set up for upper and lower urinary tract evaluation.    We will send her urine today for MDX to evaluate " for any other uropathogens.    We will have her continue vaginal estrogen cream and discussed how to insert this appropriately.    Also discussed with her how to utilize the steroid and antifungal combo.      I spent 15 minutes caring for Salome on this date of service. This time includes time spent by me in the following activities:reviewing tests, obtaining and/or reviewing a separately obtained history, performing a medically appropriate examination and/or evaluation , counseling and educating the patient/family/caregiver, ordering medications, tests, or procedures, and documenting information in the medical record  Follow Up   Return for Follow-up with me in 3 months follow-up with Dr. Villasenor for cystoscopy.  Patient was given instructions and counseling regarding her condition or for health maintenance advice. Please see specific information pulled into the AVS if appropriate.         This document has been electronically signed by VERÓNICA Norman  November 7, 2023 11:35 EST

## 2023-11-08 ENCOUNTER — HOSPITAL ENCOUNTER (OUTPATIENT)
Dept: MAMMOGRAPHY | Facility: HOSPITAL | Age: 78
Discharge: HOME | End: 2023-11-08
Admitting: NURSE PRACTITIONER
Payer: MEDICARE

## 2023-11-08 DIAGNOSIS — Z12.31 ENCOUNTER FOR SCREENING MAMMOGRAM FOR MALIGNANT NEOPLASM OF BREAST: ICD-10-CM

## 2023-11-08 PROCEDURE — 77067 SCR MAMMO BI INCL CAD: CPT

## 2023-11-08 PROCEDURE — 77063 BREAST TOMOSYNTHESIS BI: CPT

## 2023-11-09 ENCOUNTER — TELEPHONE (OUTPATIENT)
Dept: UROLOGY | Facility: CLINIC | Age: 78
End: 2023-11-09
Payer: MEDICARE

## 2023-11-09 NOTE — TELEPHONE ENCOUNTER
Spoke to pt.  Pt already has all medications.  When she came in on Tuesday, it was the medicine in her  shirt pocket.  The white and yellow box.  She is of understanding.

## 2023-11-09 NOTE — TELEPHONE ENCOUNTER
PT WAS SEEN TUESDAY, SHE SAID THAT SHE WAS TOLD THAT SHE WOULD BE GETTING A STEROID SENT IN BUT HER PHARMACY HAS NOT RECEIVED ANYTHING.

## 2023-11-10 DIAGNOSIS — N39.0 RECURRENT UTI: Primary | ICD-10-CM

## 2023-11-10 DIAGNOSIS — N39.0 RECURRENT UTI: ICD-10-CM

## 2023-11-10 RX ORDER — CEFDINIR 300 MG/1
300 CAPSULE ORAL 2 TIMES DAILY
Qty: 20 CAPSULE | Refills: 0 | Status: SHIPPED | OUTPATIENT
Start: 2023-11-10 | End: 2023-11-20

## 2023-11-20 ENCOUNTER — HOSPITAL ENCOUNTER (OUTPATIENT)
Dept: CT IMAGING | Facility: HOSPITAL | Age: 78
Discharge: HOME | End: 2023-11-20
Admitting: NURSE PRACTITIONER
Payer: MEDICARE

## 2023-11-20 DIAGNOSIS — N39.0 RECURRENT UTI: ICD-10-CM

## 2023-11-20 LAB
CREAT BLDA-MCNC: 1.2 MG/DL
EGFRCR SERPLBLD CKD-EPI 2021: 47 ML/MIN/1.73

## 2023-11-20 PROCEDURE — 25510000001 IOPAMIDOL PER 1 ML: Performed by: NURSE PRACTITIONER

## 2023-11-20 PROCEDURE — 82565 ASSAY OF CREATININE: CPT

## 2023-11-20 PROCEDURE — 74178 CT ABD&PLV WO CNTR FLWD CNTR: CPT

## 2023-11-20 RX ADMIN — IOPAMIDOL 100 ML: 755 INJECTION, SOLUTION INTRAVENOUS at 13:10

## 2023-11-28 ENCOUNTER — TELEPHONE (OUTPATIENT)
Dept: UROLOGY | Facility: CLINIC | Age: 78
End: 2023-11-28
Payer: MEDICARE

## 2023-11-28 NOTE — TELEPHONE ENCOUNTER
PATIENT IS SCHEDULED FOR A OFFICE CYSTO ON 1/30/23 WITH DR BLACKMON. SHE STATES SHE DOES NOT WANT TO WAIT THAT LONG TO HAVE THE SCOPE DONE, SHE IS WANTING IT SOONER. SHE WOULD LIKE A CALL BACK. # [phone].

## 2023-12-12 ENCOUNTER — PREP FOR SURGERY (OUTPATIENT)
Dept: OTHER | Facility: HOSPITAL | Age: 78
End: 2023-12-12
Payer: MEDICARE

## 2023-12-12 ENCOUNTER — PROCEDURE VISIT (OUTPATIENT)
Dept: UROLOGY | Facility: CLINIC | Age: 78
End: 2023-12-12
Payer: MEDICARE

## 2023-12-12 VITALS
SYSTOLIC BLOOD PRESSURE: 124 MMHG | DIASTOLIC BLOOD PRESSURE: 83 MMHG | HEIGHT: 63 IN | BODY MASS INDEX: 23.99 KG/M2 | HEART RATE: 86 BPM | WEIGHT: 135.4 LBS

## 2023-12-12 DIAGNOSIS — R31.9 URINARY TRACT INFECTION WITH HEMATURIA, SITE UNSPECIFIED: ICD-10-CM

## 2023-12-12 DIAGNOSIS — N39.0 RECURRENT UTI: Primary | ICD-10-CM

## 2023-12-12 DIAGNOSIS — N32.9 LESION OF BLADDER: Primary | ICD-10-CM

## 2023-12-12 DIAGNOSIS — N39.0 RECURRENT UTI: ICD-10-CM

## 2023-12-12 DIAGNOSIS — N32.9 LESION OF URINARY BLADDER: Primary | ICD-10-CM

## 2023-12-12 DIAGNOSIS — N32.9 LESION OF URINARY BLADDER: ICD-10-CM

## 2023-12-12 DIAGNOSIS — N39.0 URINARY TRACT INFECTION WITH HEMATURIA, SITE UNSPECIFIED: ICD-10-CM

## 2023-12-12 RX ORDER — NITROFURANTOIN 25; 75 MG/1; MG/1
100 CAPSULE ORAL 2 TIMES DAILY
Qty: 6 CAPSULE | Refills: 0 | Status: SHIPPED | OUTPATIENT
Start: 2023-12-12

## 2023-12-12 NOTE — PROGRESS NOTES
Preprocedure diagnosis  Recurrent UTI    Postprocedure diagnosis  Same as above    Procedure  Flexible Cystourethroscopy    Attending surgeon  Yola Villasenor MD    Anesthesia  2% lidocaine jelly intraurethrally    Complications  None    Indications  78 y.o. female undergoing a flexible cystoscopy for the above mentioned indications.  Presents for lower urinary tract evaluation, history of recurrent UTI.  CT with and without contrast performed prior to today's visit indicates focal thickening of right lateral wall of urinary bladder with subtle enhancement, atypical finding for infection.  Informed consent was obtained.      Findings  Patent urethra, large capacity bladder; cystoscopy revealed one right and left ureteral orifice in the normal anatomic position, normal bladder mucosa and no tumors, masses or stones.  Discrete erythema of bladder and a patch approximately 2 to 3 cm, right lateral and posterior wall.  No other areas of suspicion    Procedure  The patient was placed in supine position and prepped and draped in sterile fashion with lidocaine jelly per urethra for anesthesia.  A timeout was performed.  The 14F flexible cystoscope was lubricated and gently placed through the urethra and into the bladder.  The bladder was completely visualized.  The cystoscope was retroflexed and the bladder neck visualized. Findings were as above. The scope was withdrawn and the procedure terminated.  The patient tolerated the procedure well.        Plan:  Tolerated procedure well.  Instructions provided.  Cystoscopic findings discussed with patient include right lateral and posterior bladder wall lesion with discrete erythema, friability mucosa, and mucosal heaping; no discrete tumors or masses but suspicious for urinary malignancy.  Recommend proceeding to the OR, cystoscopy, possible resection of bladder tumor.  Risk, benefits, and alternatives were discussed with patient at length.  Risks including but not limited to  bleeding, infection, damage surrounding structure, pain, catheterization, overnight stay in the hospital, need for further procedures, and risk of anesthesia including up to death.  Patient notes understanding agreeable to proceed.    Schedule OR  Empiric nitrofurantoin given today's instrumentation; urine dip prior to procedure unremarkable  all questions addressed

## 2023-12-13 ENCOUNTER — TELEPHONE (OUTPATIENT)
Dept: UROLOGY | Facility: CLINIC | Age: 78
End: 2023-12-13
Payer: MEDICARE

## 2023-12-13 NOTE — TELEPHONE ENCOUNTER
PT WAS SEEN YESTERDAY AND PRESCRIBED NITROFURANTOIN. SHE STATES THAT SHE CANNOT TAKE IT, SHE HAS BEEN HAVING DIARRHEA SINCE SHE STARTED IT. PLEASE ADVISE.

## 2023-12-13 NOTE — TELEPHONE ENCOUNTER
Ok to stop; was only prescribed out of precaution. Urine dip prior to procedure did not look suspicious for infection,   If becomes symptomatic; will need repeat urine test to assess need for additional abx.thanks

## 2023-12-18 ENCOUNTER — LAB (OUTPATIENT)
Dept: LAB | Facility: HOSPITAL | Age: 78
End: 2023-12-18
Payer: MEDICARE

## 2023-12-18 DIAGNOSIS — R31.9 URINARY TRACT INFECTION WITH HEMATURIA, SITE UNSPECIFIED: ICD-10-CM

## 2023-12-18 DIAGNOSIS — N39.0 URINARY TRACT INFECTION WITH HEMATURIA, SITE UNSPECIFIED: ICD-10-CM

## 2023-12-18 DIAGNOSIS — R30.0 DYSURIA: Primary | ICD-10-CM

## 2023-12-18 DIAGNOSIS — N32.9 LESION OF URINARY BLADDER: ICD-10-CM

## 2023-12-18 DIAGNOSIS — N39.0 RECURRENT UTI: ICD-10-CM

## 2023-12-18 PROCEDURE — 87086 URINE CULTURE/COLONY COUNT: CPT

## 2023-12-18 PROCEDURE — 87186 SC STD MICRODIL/AGAR DIL: CPT

## 2023-12-18 PROCEDURE — 87077 CULTURE AEROBIC IDENTIFY: CPT

## 2023-12-18 NOTE — TELEPHONE ENCOUNTER
PATIENT HAD A CYSTOSCOPY 12/12/23.  SHE CALLED AND SAID SHE HAS FREQUENT URINATION,  AND A LITTLE BIT OF BURNING.  IT IS GETTING WORSE.    SHE SAID SHE WAS UNABLE TO TAKE THE ANTIBIOTIC THAT WAS PRESCRIBED.    CAN SOMETHING ELSE BE SENT.

## 2023-12-18 NOTE — TELEPHONE ENCOUNTER
SPOKE TO PT AND ADVISED PER JAISON BERRY, PT WOULD NEED TO GET A UCX DONE SINCE SHE IS SYMPTOMATIC. ORDER PUT IN CHART. ADVISED ONCE WE HAVE THE RESULTS, WE WILL KNOW WHAT BACTERIA, THEREFORE WHAT ABX SHE NEEDS. PT STATED SHE WASN'T GETTING OUT IN THE [place] TODAY. I ADVISED HER TO DO AS SOON AS SHE'S ABLE. PT EXPRESSED UNDERSTANDING AND IS AGREEABLE

## 2023-12-19 ENCOUNTER — TELEPHONE (OUTPATIENT)
Dept: UROLOGY | Facility: CLINIC | Age: 78
End: 2023-12-19
Payer: MEDICARE

## 2023-12-19 DIAGNOSIS — N39.0 RECURRENT UTI: Primary | ICD-10-CM

## 2023-12-19 RX ORDER — CEFDINIR 300 MG/1
300 CAPSULE ORAL 2 TIMES DAILY
Qty: 14 CAPSULE | Refills: 0 | Status: SHIPPED | OUTPATIENT
Start: 2023-12-19 | End: 2023-12-26

## 2023-12-19 NOTE — TELEPHONE ENCOUNTER
----- Message from Yola Villasenor MD sent at 12/19/2023 12:59 PM EST -----  Since symptomatic; put I for cefdinir; sensitivities pending so may need to adjust abx; thanks

## 2023-12-19 NOTE — TELEPHONE ENCOUNTER
PATIENT CALLED FOR URINE RESULTS AND TO FIND OUT IF ANTIBIOTICS ARE BEING CALLED IN FOR HER.  SHE SAID SHE IS IN MISERY.

## 2023-12-20 LAB — BACTERIA SPEC AEROBE CULT: ABNORMAL

## 2023-12-26 ENCOUNTER — LAB (OUTPATIENT)
Dept: LAB | Facility: HOSPITAL | Age: 78
End: 2023-12-26
Payer: MEDICARE

## 2023-12-26 ENCOUNTER — TELEPHONE (OUTPATIENT)
Dept: UROLOGY | Facility: CLINIC | Age: 78
End: 2023-12-26
Payer: MEDICARE

## 2023-12-26 DIAGNOSIS — R30.0 DYSURIA: ICD-10-CM

## 2023-12-26 DIAGNOSIS — N39.0 RECURRENT UTI: Primary | ICD-10-CM

## 2023-12-26 DIAGNOSIS — N39.0 RECURRENT UTI: ICD-10-CM

## 2023-12-26 PROCEDURE — 87086 URINE CULTURE/COLONY COUNT: CPT

## 2023-12-26 NOTE — TELEPHONE ENCOUNTER
ADVISED PT THAT I PUT IN A NEW UCX ORDER AND THAT SHE SHOULD HAVE THAT DONE TODAY. ADVISED IF SHE NEEDS ANOTHER ABX, WE WILL NOTIFY HER ONCE THE CULTURE RESULTS. PT EXPRESSED UNDERSTANDING AND IS AGREEABLE.

## 2023-12-26 NOTE — TELEPHONE ENCOUNTER
PATIENT CALLED AND SAID SHE COMPLETED HER ANTIBIOTIC FOR UTI TODAY, BUT SHE IS STILL HAVING FREQUENCY AND PAIN WITH URINATION.

## 2023-12-27 LAB — BACTERIA SPEC AEROBE CULT: ABNORMAL

## 2023-12-28 ENCOUNTER — TELEPHONE (OUTPATIENT)
Dept: UROLOGY | Facility: CLINIC | Age: 78
End: 2023-12-28
Payer: MEDICARE

## 2023-12-28 DIAGNOSIS — N39.0 URINARY TRACT INFECTION WITH HEMATURIA, SITE UNSPECIFIED: ICD-10-CM

## 2023-12-28 DIAGNOSIS — R31.9 URINARY TRACT INFECTION WITH HEMATURIA, SITE UNSPECIFIED: ICD-10-CM

## 2023-12-28 RX ORDER — PHENAZOPYRIDINE HYDROCHLORIDE 100 MG/1
100 TABLET, FILM COATED ORAL 3 TIMES DAILY PRN
Qty: 20 TABLET | Refills: 0 | Status: SHIPPED | OUTPATIENT
Start: 2023-12-28

## 2023-12-28 NOTE — TELEPHONE ENCOUNTER
SPOKE TO PT AND ADVISED THAT PER SABA ALVAREZ, NO ABX ARE NEEDED. I DID SEND IN SOME PYRIDIUM, AND ADVISED PT TO INCREASE HER FLUID INTAKE. PT EXPRESSED UNDERSTANDING AND IS AGREEABLE.

## 2023-12-28 NOTE — TELEPHONE ENCOUNTER
----- Message from VERÓNICA Norman sent at 12/28/2023  8:22 AM EST -----  I wouldn't honestly if she just came off of antibiotics the day before

## 2024-01-08 ENCOUNTER — LAB (OUTPATIENT)
Dept: LAB | Facility: HOSPITAL | Age: 79
End: 2024-01-08
Payer: MEDICARE

## 2024-01-08 DIAGNOSIS — R31.9 URINARY TRACT INFECTION WITH HEMATURIA, SITE UNSPECIFIED: ICD-10-CM

## 2024-01-08 DIAGNOSIS — N39.0 URINARY TRACT INFECTION WITH HEMATURIA, SITE UNSPECIFIED: ICD-10-CM

## 2024-01-08 DIAGNOSIS — N39.0 URINARY TRACT INFECTION WITH HEMATURIA, SITE UNSPECIFIED: Primary | ICD-10-CM

## 2024-01-08 DIAGNOSIS — R30.0 DYSURIA: ICD-10-CM

## 2024-01-08 DIAGNOSIS — R31.9 URINARY TRACT INFECTION WITH HEMATURIA, SITE UNSPECIFIED: Primary | ICD-10-CM

## 2024-01-08 PROCEDURE — 87086 URINE CULTURE/COLONY COUNT: CPT

## 2024-01-09 LAB — BACTERIA SPEC AEROBE CULT: NORMAL

## 2024-01-13 ENCOUNTER — ANESTHESIA EVENT (OUTPATIENT)
Dept: PERIOP | Facility: HOSPITAL | Age: 79
End: 2024-01-13
Payer: MEDICARE

## 2024-01-15 ENCOUNTER — ANESTHESIA (OUTPATIENT)
Dept: PERIOP | Facility: HOSPITAL | Age: 79
End: 2024-01-15
Payer: MEDICARE

## 2024-01-15 ENCOUNTER — HOSPITAL ENCOUNTER (OUTPATIENT)
Facility: HOSPITAL | Age: 79
Setting detail: HOSPITAL OUTPATIENT SURGERY
Discharge: HOME | End: 2024-01-15
Attending: UROLOGY | Admitting: UROLOGY
Payer: MEDICARE

## 2024-01-15 VITALS
WEIGHT: 129.63 LBS | OXYGEN SATURATION: 99 % | SYSTOLIC BLOOD PRESSURE: 147 MMHG | TEMPERATURE: 96.5 F | HEIGHT: 61 IN | BODY MASS INDEX: 24.47 KG/M2 | DIASTOLIC BLOOD PRESSURE: 91 MMHG | HEART RATE: 87 BPM | RESPIRATION RATE: 18 BRPM

## 2024-01-15 DIAGNOSIS — N39.0 RECURRENT UTI: ICD-10-CM

## 2024-01-15 DIAGNOSIS — N32.9 LESION OF BLADDER: ICD-10-CM

## 2024-01-15 LAB — GLUCOSE BLDC GLUCOMTR-MCNC: 112 MG/DL (ref 70–99)

## 2024-01-15 PROCEDURE — G0463 HOSPITAL OUTPT CLINIC VISIT: HCPCS | Performed by: UROLOGY

## 2024-01-15 PROCEDURE — S0260 H&P FOR SURGERY: HCPCS | Performed by: UROLOGY

## 2024-01-15 PROCEDURE — 82948 REAGENT STRIP/BLOOD GLUCOSE: CPT

## 2024-01-15 PROCEDURE — 25810000003 LACTATED RINGERS PER 1000 ML: Performed by: ANESTHESIOLOGY

## 2024-01-15 RX ORDER — ACETAMINOPHEN 500 MG
1000 TABLET ORAL ONCE
Status: COMPLETED | OUTPATIENT
Start: 2024-01-15 | End: 2024-01-15

## 2024-01-15 RX ORDER — SODIUM CHLORIDE, SODIUM LACTATE, POTASSIUM CHLORIDE, CALCIUM CHLORIDE 600; 310; 30; 20 MG/100ML; MG/100ML; MG/100ML; MG/100ML
9 INJECTION, SOLUTION INTRAVENOUS CONTINUOUS PRN
Status: DISCONTINUED | OUTPATIENT
Start: 2024-01-15 | End: 2024-01-15 | Stop reason: HOSPADM

## 2024-01-15 RX ADMIN — SODIUM CHLORIDE, POTASSIUM CHLORIDE, SODIUM LACTATE AND CALCIUM CHLORIDE 9 ML/HR: 600; 310; 30; 20 INJECTION, SOLUTION INTRAVENOUS at 10:05

## 2024-01-15 RX ADMIN — ACETAMINOPHEN 1000 MG: 500 TABLET ORAL at 10:05

## 2024-01-15 NOTE — ANESTHESIA PREPROCEDURE EVALUATION
Anesthesia Evaluation     Patient summary reviewed and Nursing notes reviewed   no history of anesthetic complications:   NPO Solid Status: > 8 hours  NPO Liquid Status: > 2 hours           Airway   Mallampati: II  TM distance: <3 FB  Neck ROM: full  No difficulty expected  Dental      Pulmonary - negative pulmonary ROS and normal exam    breath sounds clear to auscultation  Cardiovascular - normal exam  Exercise tolerance: good (4-7 METS)    ECG reviewed  Rhythm: regular  Rate: normal    (+) hypertension, hyperlipidemia      Neuro/Psych  (+) tremors  GI/Hepatic/Renal/Endo    (+) renal disease- CRI, diabetes mellitus  (-) no thyroid disorder    Musculoskeletal     Abdominal    Substance History - negative use     OB/GYN negative ob/gyn ROS         Other   arthritis,     ROS/Med Hx Other: PAT Nursing Notes unavailable.     Echo- EF 55-60%, mild MR, trace MO, midly elevated RSVP 38 mmHg    EKG- SR, RBBB    Can tolerate paper tape                          Anesthesia Plan    ASA 3     general   total IV anesthesia    Anesthetic plan, risks, benefits, and alternatives have been provided, discussed and informed consent has been obtained with: patient.        CODE STATUS:

## 2024-01-15 NOTE — H&P
[facility]   Urology Preop H&P Note    Patient Name: Salome Reeves  : 1945  MRN: [id number]  Primary Care Physician:  Jennie Reynolds APRN  Referring Physician: Yola Villasenor MD  Date of admission: 1/15/2024    Subjective   Subjective     Reason for Consult/ Chief Complaint: Lesion of bladder [N32.9]  Recurrent UTI [N39.0]    HPI:  Salome Reeves is a 78 y.o. female history ofLesion of bladder [N32.9]  Recurrent UTI [N39.0] who presents for further management OR.  Presents for planned Procedure(s):  CYSTOSCOPY POSSIBLE TRANSURETHRAL RESECTION OF BLADDER TUMOR;  .  .  Risk, benefits, and alternatives discussed with patient prior to today.All questions were addressed after providing time for discussion.  Patient denies significant changes since last visit.  No new complaints today.    Review of Systems   All systems were reviewed and negative except for the above  Personal History     Past Medical History:   Diagnosis Date    Arthritis     Bladder disorder     Diabetes     Hyperlipemia     Hypertension     Kidney disease     Limb swelling     Neurologic disorder     PONV (postoperative nausea and vomiting)     Seasonal allergies     Thyroid disorder     Tremors of nervous system        Past Surgical History:   Procedure Laterality Date    APPENDECTOMY      CATARACT EXTRACTION      COLONOSCOPY      [facility]    COLONOSCOPY N/A 3/31/2022    Procedure: COLONOSCOPY;  Surgeon: Little Mcgee MD;  Location: [facility] ENDOSCOPY;  Service: Gastroenterology;  Laterality: N/A;  DIVERTICULOSIS COLITIS    ENDOSCOPY N/A 3/31/2022    Procedure: ESOPHAGOGASTRODUODENOSCOPY;  Surgeon: Little Mcgee MD;  Location: [facility] ENDOSCOPY;  Service: Gastroenterology;  Laterality: N/A;  GASTRITIS    HYSTERECTOMY      PARATHYROIDECTOMY  2014    UPPER GASTROINTESTINAL ENDOSCOPY      [facility]       Family History: family history includes Cancer in her father and mother. Otherwise pertinent FHx was reviewed  "and not pertinent to current issue.    Social History:  reports that she has never smoked. She has never been exposed to tobacco smoke. She has never used smokeless tobacco. She reports that she does not drink alcohol and does not use drugs.    Home Medications:  Aspirin Buf(CaCarb-MgCarb-MgO), Budesonide, Mirabegron ER, clotrimazole-betamethasone, diphenhydrAMINE-acetaminophen, glimepiride, hydroCHLOROthiazide, lisinopril, loperamide, lovastatin, metFORMIN ER, nitrofurantoin (macrocrystal-monohydrate), pantoprazole, phenazopyridine, primidone, propranolol LA, saccharomyces boulardii, sodium bicarbonate, sodium zirconium cyclosilicate, and topiramate    Allergies:  Allergies   Allergen Reactions    Adhesive Tape Other (See Comments)     BLISTERS,SKIN BURN; \"PAPER TAPE IS FINE\"      Amoxicillin-Pot Clavulanate Nausea Only     NAUSEA AND SEVERE DIARRHEA      Iodine Rash     BLISTERS       Objective    Objective     Vitals:   Temp:  [96.5 °F (35.8 °C)] 96.5 °F (35.8 °C)  Heart Rate:  [87] 87  Resp:  [18] 18  BP: (147)/(91) 147/91    Physical Exam:   Constitutional: Awake, alert   Respiratory: Clear, nonlabored respirations    Cardiovascular: Regular rate, no chest retractions   gastrointestinal: Appears soft, nontender     Results:    Assessment & Plan   Assessment / Plan     Brief Patient Summary:  Salome Reeves is a 78 y.o. female who     Active Hospital Problems:  Active Hospital Problems    Diagnosis     Lesion of bladder     Recurrent UTI        Plan:   Proceed to the OR for planned procedure, Procedure(s):  CYSTOSCOPY POSSIBLE TRANSURETHRAL RESECTION OF BLADDER TUMOR,  ,   Risk, benefits, and alternatives discussed with patient at length she is agreeable to proceed  All questions addressed      Electronically signed by Yola Villasenor MD, 01/15/24, 1:20 PM EST.     "

## 2024-01-15 NOTE — H&P (VIEW-ONLY)
[facility]   Urology Preop H&P Note    Patient Name: Salome Reeves  : 1945  MRN: [id number]  Primary Care Physician:  Jennie Reynolds APRN  Referring Physician: Yola Villasenor MD  Date of admission: 1/15/2024    Subjective   Subjective     Reason for Consult/ Chief Complaint: Lesion of bladder [N32.9]  Recurrent UTI [N39.0]    HPI:  Salome Reeves is a 78 y.o. female history ofLesion of bladder [N32.9]  Recurrent UTI [N39.0] who presents for further management OR.  Presents for planned Procedure(s):  CYSTOSCOPY POSSIBLE TRANSURETHRAL RESECTION OF BLADDER TUMOR;  .  .  Risk, benefits, and alternatives discussed with patient prior to today.All questions were addressed after providing time for discussion.  Patient denies significant changes since last visit.  No new complaints today.    Review of Systems   All systems were reviewed and negative except for the above  Personal History     Past Medical History:   Diagnosis Date    Arthritis     Bladder disorder     Diabetes     Hyperlipemia     Hypertension     Kidney disease     Limb swelling     Neurologic disorder     PONV (postoperative nausea and vomiting)     Seasonal allergies     Thyroid disorder     Tremors of nervous system        Past Surgical History:   Procedure Laterality Date    APPENDECTOMY      CATARACT EXTRACTION      COLONOSCOPY      [facility] [place]    COLONOSCOPY N/A 3/31/2022    Procedure: COLONOSCOPY;  Surgeon: Little Mcgee MD;  Location: [facility] ENDOSCOPY;  Service: Gastroenterology;  Laterality: N/A;  DIVERTICULOSIS COLITIS    ENDOSCOPY N/A 3/31/2022    Procedure: ESOPHAGOGASTRODUODENOSCOPY;  Surgeon: Little Mcgee MD;  Location: [facility] ENDOSCOPY;  Service: Gastroenterology;  Laterality: N/A;  GASTRITIS    HYSTERECTOMY      PARATHYROIDECTOMY  2014    UPPER GASTROINTESTINAL ENDOSCOPY      [facility] [place]       Family History: family history includes Cancer in her father and mother. Otherwise pertinent FHx was reviewed  "and not pertinent to current issue.    Social History:  reports that she has never smoked. She has never been exposed to tobacco smoke. She has never used smokeless tobacco. She reports that she does not drink alcohol and does not use drugs.    Home Medications:  Aspirin Buf(CaCarb-MgCarb-MgO), Budesonide, Mirabegron ER, clotrimazole-betamethasone, diphenhydrAMINE-acetaminophen, glimepiride, hydroCHLOROthiazide, lisinopril, loperamide, lovastatin, metFORMIN ER, nitrofurantoin (macrocrystal-monohydrate), pantoprazole, phenazopyridine, primidone, propranolol LA, saccharomyces boulardii, sodium bicarbonate, sodium zirconium cyclosilicate, and topiramate    Allergies:  Allergies   Allergen Reactions    Adhesive Tape Other (See Comments)     BLISTERS,SKIN BURN; \"PAPER TAPE IS FINE\"      Amoxicillin-Pot Clavulanate Nausea Only     NAUSEA AND SEVERE DIARRHEA      Iodine Rash     BLISTERS       Objective    Objective     Vitals:   Temp:  [96.5 °F (35.8 °C)] 96.5 °F (35.8 °C)  Heart Rate:  [87] 87  Resp:  [18] 18  BP: (147)/(91) 147/91    Physical Exam:   Constitutional: Awake, alert   Respiratory: Clear, nonlabored respirations    Cardiovascular: Regular rate, no chest retractions   gastrointestinal: Appears soft, nontender     Results:    Assessment & Plan   Assessment / Plan     Brief Patient Summary:  Salome Reeves is a 78 y.o. female who     Active Hospital Problems:  Active Hospital Problems    Diagnosis     Lesion of bladder     Recurrent UTI        Plan:   Proceed to the OR for planned procedure, Procedure(s):  CYSTOSCOPY POSSIBLE TRANSURETHRAL RESECTION OF BLADDER TUMOR,  ,   Risk, benefits, and alternatives discussed with patient at length she is agreeable to proceed  All questions addressed      Electronically signed by Yola Villasenor MD, 01/15/24, 1:20 PM EST.     "

## 2024-01-15 NOTE — NURSING NOTE
Delay in patient going to surgery and patient and  decide to cancel for today and will reschedule.  Road conditions and  not being able to drive after dark related to patient choosing to cancel.

## 2024-01-16 DIAGNOSIS — Z01.818 PRE-OP TESTING: ICD-10-CM

## 2024-01-16 DIAGNOSIS — R30.0 DYSURIA: ICD-10-CM

## 2024-01-16 DIAGNOSIS — N32.9 LESION OF URINARY BLADDER: Primary | ICD-10-CM

## 2024-01-19 ENCOUNTER — PREP FOR SURGERY (OUTPATIENT)
Dept: OTHER | Facility: HOSPITAL | Age: 79
End: 2024-01-19
Payer: MEDICARE

## 2024-01-19 DIAGNOSIS — N32.9 LESION OF URINARY BLADDER: Primary | ICD-10-CM

## 2024-01-19 RX ORDER — LEVOFLOXACIN 5 MG/ML
500 INJECTION, SOLUTION INTRAVENOUS ONCE
OUTPATIENT
Start: 2024-01-19 | End: 2024-01-19

## 2024-01-22 PROBLEM — N32.9 LESION OF URINARY BLADDER: Status: ACTIVE | Noted: 2024-01-19

## 2024-02-02 ENCOUNTER — LAB (OUTPATIENT)
Dept: LAB | Facility: HOSPITAL | Age: 79
End: 2024-02-02
Payer: MEDICARE

## 2024-02-02 DIAGNOSIS — Z01.818 PRE-OP TESTING: ICD-10-CM

## 2024-02-02 DIAGNOSIS — R30.0 DYSURIA: ICD-10-CM

## 2024-02-02 DIAGNOSIS — N32.9 LESION OF URINARY BLADDER: ICD-10-CM

## 2024-02-02 PROCEDURE — 87086 URINE CULTURE/COLONY COUNT: CPT

## 2024-02-03 LAB — BACTERIA SPEC AEROBE CULT: NO GROWTH

## 2024-02-07 NOTE — TELEPHONE ENCOUNTER
Date Performed:  02/06/2024    HISTORY:  The patient is a most pleasant 66-year-old male with active medical conditions including hypertension, hyperlipidemia, hyperglycemia, past history of herpes zoster, who presents at his kind request for annual wellness visit/physical exam.  He states that he is feeling well.  He states he did cut back his pravastatin from a whole tablet from 40 mg daily down to 20 mg daily (1/2 of a 40 mg tablet).  He states he did this on his own because he thought that half a tablet would work.  He is requesting refills of pravastatin and acyclovir.  He states he will increase the dose of pravastatin.    He states he does follow up with Dr. Rabago (cardiology), later this month.  He also has a followup with Dr. Rosa (urology), for a bout of hematuria.  He states he did undergo a cystoscopy procedure, which did not show any evidence of bladder tumor.  CT urogram shows questionable punctate mass which may be a kidney stone.  He has a followup with her as well.  He states he does wear CPAP device.  He does see Dr. Vu for obstructive sleep apnea.    REVIEW OF SYSTEMS:  All other review of systems otherwise reviewed and negative.    PAST HISTORY:  Reviewed per Epic.    PHYSICAL EXAMINATION:  VITAL SIGNS:  Per the nurse, blood pressure 122/78, pulse 69 and regular, weight is 173 pounds.  5 feet 10 inches tall.  HEENT:  Head normocephalic.  Eyes, pupils are equal.  Sclerae and conjunctivae are clear.  Exterior ears appear unremarkable.  Tympanic membranes are clear bilaterally.  Mouth, posterior pharynx is clear.  Tongue is midline.  No suspicious masses.  NECK:  Supple without any lymphadenopathy or thyromegaly.  LUNGS:  Clear to auscultation throughout.  HEART:  Reveals a regular rate and rhythm.  Normal S1, S2.  No murmurs.  ABDOMEN:  Soft and nontender.  There is no hepatosplenomegaly or other masses.  No guarding or rebound tenderness.  Normoactive bowel sounds in all quadrants.  There are  Given that she has microscopic colitis, would recommend that stop protonix.  I will send 2 week course of carafate.     no pulsatile masses nor any abdominal bruits.  :  He declines genital and rectal exam.  EXTREMITIES:  No edema is noted in lower extremities bilaterally.  NEUROLOGIC:  Cranial nerves 2 through 12 are grossly intact.  There are no focal neurologic findings.  PSYCH:  Judgment and insight are appropriate.  Mood and affect are appropriate.  Gait is normal.  He is cognitively intact per observation.  VASCULAR:  Carotid upstrokes are brisk bilaterally.  No carotid bruits appreciated bilaterally.  Pedal pulses are strong and intact bilaterally.    LABORATORY EVALUATION:  Reviewed in detail with Margarito.  Comprehensive metabolic panel is normal other than glucose 106, cholesterol 212, HDL 52, , triglycerides 115 (increased).  CPK is 137.  CBC is normal.  Urinalysis is unremarkable.  PSA is 2.14 (stable).    ASSESSMENT AND PLAN:  1. Annual wellness visit.  He is up to date with colonoscopy.  He is not due again until February 2027.  He is up to date with PSA screening.  He states he has regular eye exams.  He will see us again in a year's time for wellness visit.  In addition, we talked about abdominal screening for abdominal aortic ultrasound evaluation.  He states he will check with his insurance to see if this is covered and he is still working full time.  I did also write down for information about lifeline screening for carotids and aorta as well.  He will look into this.  2. Hyperlipidemia.  He is on statin therapy.  I have asked him to increase his pravastatin back to 40 mg daily.  He states he tolerates 20 mg finally.  If he has any problems, he will let me know.  3. Atherosclerotic heart disease.  Again, he states he sees Dr. Rabago.  States he has been off his lisinopril for a year.  States his blood pressures did good without it.  He continues on his metoprolol.  4. Hyperglycemia.  As noted.  He understands the importance of continue to work on his weight and watch his dietary consumption of  carbohydrates.  5. Hypertension.  Blood pressure is very well controlled.  6. Microscopic hematuria.  Again, he has seen Dr. Rosa.  We will continue following with her.  He denies any gross hematuria.    Please note office visit, review of chart, dictation, discussion, and completion of note did utilize 45 minutes.        Dictated By:  Cristopher Leon MD  Signing Provider:  Cristopher Leon MD    DJN/AQT  D:  02/06/2024 06:00:21 PM  T:  02/06/2024 06:50:58 PM  Job:  [id number]

## 2024-02-08 ENCOUNTER — ANESTHESIA EVENT (OUTPATIENT)
Dept: PERIOP | Facility: HOSPITAL | Age: 79
End: 2024-02-08
Payer: MEDICARE

## 2024-02-08 RX ORDER — ASPIRIN 81 MG/1
81 TABLET ORAL DAILY
Status: ON HOLD | COMMUNITY
End: 2024-02-09 | Stop reason: SDUPTHER

## 2024-02-08 NOTE — PRE-PROCEDURE INSTRUCTIONS
IMPORTANT INSTRUCTIONS - PRE-ADMISSION TESTING  DO NOT EAT OR CHEW anything after midnight the night before your procedure.    You may have CLEAR liquids up to _2___ hours prior to ARRIVAL time.   Take the following medications the morning of your procedure with JUST A SIP OF WATER:  __PROPANOLOL ______________  DO NOT BRING your medications to the hospital with you, UNLESS something has changed since your PRE-Admission Testing appointment.  Hold all vitamins, supplements, and NSAIDS (Non- steroidal anti-inflammatory meds) for one week prior to surgery (you MAY take Tylenol or Acetaminophen).  If you are diabetic, check your blood sugar the morning of your procedure. If it is less than 70 or if you are feeling symptomatic, call the following number for further instructions: [phone]_.  Use your inhalers/nebulizers as usual, the morning of your procedure. BRING YOUR INHALERS with you.   Bring your CPAP or BIPAP to hospital, ONLY IF YOU WILL BE SPENDING THE NIGHT.   Make sure you have a ride home and have someone who will stay with you the day of your procedure after you go home.  If you have any questions, please call your Pre-Admission Testing Nurse, ___SAYDA____ at [phone]___.   Per anesthesia request, do not smoke for 24 hours before your procedure or as instructed by your surgeon.    Clear Liquid Diet        Find out when you need to start a clear liquid diet.   Think of “clear liquids” as anything you could read a newspaper through. This includes things like water, broth, sports drinks, or tea WITHOUT any kind of milk or cream.           Once you are told to start a clear liquid diet, only drink these things until 2 hours before arrival to the hospital or when the hospital says to stop. Total volume limitation: 8 oz.       Clear liquids you CAN drink:   Water   Clear broth: beef, chicken, vegetable, or bone broth with nothing in it   Gatorade   Lemonade or Fernando-aid   Soda   Tea, coffee (NO cream or  honey)   Jell-O (without fruit)   Popsicles (without fruit or cream)   Italian ices   Juice without pulp: apple, white, grape   You may use salt, pepper, and sugar  NO RED LIQUIDS     Do NOT drink:   Milk or cream   Soy milk, almond milk, coconut milk, or other non-dairy drinks and   creamers   Milkshakes or smoothies   Tomato juice   Orange juice   Grapefruit juice   Cream soups or any other than broth         Clear Liquid Diet:  Do NOT eat any solid food.  Do NOT eat or suck on mints or candy.  Do NOT chew gum.  Do NOT drink thick liquids like milk or juice with pulp in it.  Do NOT add milk, cream, or anything like soy milk or almond milk to coffee or tea.

## 2024-02-09 ENCOUNTER — ANESTHESIA (OUTPATIENT)
Dept: PERIOP | Facility: HOSPITAL | Age: 79
End: 2024-02-09
Payer: MEDICARE

## 2024-02-09 ENCOUNTER — HOSPITAL ENCOUNTER (OUTPATIENT)
Facility: HOSPITAL | Age: 79
Setting detail: HOSPITAL OUTPATIENT SURGERY
Discharge: HOME | End: 2024-02-09
Attending: UROLOGY | Admitting: UROLOGY
Payer: MEDICARE

## 2024-02-09 VITALS
SYSTOLIC BLOOD PRESSURE: 130 MMHG | HEIGHT: 63 IN | TEMPERATURE: 97.1 F | BODY MASS INDEX: 23.59 KG/M2 | OXYGEN SATURATION: 99 % | DIASTOLIC BLOOD PRESSURE: 69 MMHG | RESPIRATION RATE: 16 BRPM | WEIGHT: 133.16 LBS | HEART RATE: 78 BPM

## 2024-02-09 DIAGNOSIS — N32.9 LESION OF URINARY BLADDER: ICD-10-CM

## 2024-02-09 DIAGNOSIS — N32.9 LESION OF BLADDER: Primary | ICD-10-CM

## 2024-02-09 LAB
ANION GAP SERPL CALCULATED.3IONS-SCNC: 10.8 MMOL/L (ref 5–15)
BUN SERPL-MCNC: 19 MG/DL (ref 8–23)
BUN/CREAT SERPL: 17.6 (ref 7–25)
CALCIUM SPEC-SCNC: 9.7 MG/DL (ref 8.6–10.5)
CHLORIDE SERPL-SCNC: 103 MMOL/L (ref 98–107)
CO2 SERPL-SCNC: 25.2 MMOL/L (ref 22–29)
CREAT SERPL-MCNC: 1.08 MG/DL (ref 0.57–1)
EGFRCR SERPLBLD CKD-EPI 2021: 53 ML/MIN/1.73
GLUCOSE BLDC GLUCOMTR-MCNC: 115 MG/DL (ref 70–99)
GLUCOSE SERPL-MCNC: 136 MG/DL (ref 65–99)
POTASSIUM SERPL-SCNC: 4.9 MMOL/L (ref 3.5–5.2)
SODIUM SERPL-SCNC: 139 MMOL/L (ref 136–145)

## 2024-02-09 PROCEDURE — 25010000002 PROPOFOL 10 MG/ML EMULSION: Performed by: NURSE ANESTHETIST, CERTIFIED REGISTERED

## 2024-02-09 PROCEDURE — 93010 ELECTROCARDIOGRAM REPORT: CPT | Performed by: INTERNAL MEDICINE

## 2024-02-09 PROCEDURE — 80048 BASIC METABOLIC PNL TOTAL CA: CPT | Performed by: UROLOGY

## 2024-02-09 PROCEDURE — 52224 CYSTOSCOPY AND TREATMENT: CPT | Performed by: UROLOGY

## 2024-02-09 PROCEDURE — 25010000002 LEVOFLOXACIN PER 250 MG: Performed by: UROLOGY

## 2024-02-09 PROCEDURE — 93005 ELECTROCARDIOGRAM TRACING: CPT | Performed by: ANESTHESIOLOGY

## 2024-02-09 PROCEDURE — 25810000003 LACTATED RINGERS PER 1000 ML: Performed by: ANESTHESIOLOGY

## 2024-02-09 PROCEDURE — 88307 TISSUE EXAM BY PATHOLOGIST: CPT | Performed by: UROLOGY

## 2024-02-09 PROCEDURE — 25010000002 FENTANYL CITRATE (PF) 50 MCG/ML SOLUTION: Performed by: NURSE ANESTHETIST, CERTIFIED REGISTERED

## 2024-02-09 PROCEDURE — 82948 REAGENT STRIP/BLOOD GLUCOSE: CPT

## 2024-02-09 PROCEDURE — 25010000002 MIDAZOLAM PER 1MG: Performed by: ANESTHESIOLOGY

## 2024-02-09 RX ORDER — PHENYLEPHRINE HCL IN 0.9% NACL 1 MG/10 ML
SYRINGE (ML) INTRAVENOUS AS NEEDED
Status: DISCONTINUED | OUTPATIENT
Start: 2024-02-09 | End: 2024-02-09 | Stop reason: SURG

## 2024-02-09 RX ORDER — ACETAMINOPHEN 500 MG
1000 TABLET ORAL ONCE
Status: COMPLETED | OUTPATIENT
Start: 2024-02-09 | End: 2024-02-09

## 2024-02-09 RX ORDER — PROMETHAZINE HYDROCHLORIDE 12.5 MG/1
12.5 TABLET ORAL ONCE AS NEEDED
Status: DISCONTINUED | OUTPATIENT
Start: 2024-02-09 | End: 2024-02-09 | Stop reason: HOSPADM

## 2024-02-09 RX ORDER — HYDROCODONE BITARTRATE AND ACETAMINOPHEN 5; 325 MG/1; MG/1
.5-1 TABLET ORAL EVERY 6 HOURS PRN
Qty: 10 TABLET | Refills: 0 | Status: SHIPPED | OUTPATIENT
Start: 2024-02-09

## 2024-02-09 RX ORDER — PHENAZOPYRIDINE HYDROCHLORIDE 200 MG/1
200 TABLET, FILM COATED ORAL 3 TIMES DAILY PRN
Qty: 12 TABLET | Refills: 0 | Status: SHIPPED | OUTPATIENT
Start: 2024-02-09

## 2024-02-09 RX ORDER — ONDANSETRON 2 MG/ML
4 INJECTION INTRAMUSCULAR; INTRAVENOUS ONCE AS NEEDED
Status: DISCONTINUED | OUTPATIENT
Start: 2024-02-09 | End: 2024-02-09 | Stop reason: HOSPADM

## 2024-02-09 RX ORDER — LEVOFLOXACIN 5 MG/ML
500 INJECTION, SOLUTION INTRAVENOUS ONCE
Status: COMPLETED | OUTPATIENT
Start: 2024-02-09 | End: 2024-02-09

## 2024-02-09 RX ORDER — ACETAMINOPHEN 325 MG/1
650 TABLET ORAL ONCE
Status: DISCONTINUED | OUTPATIENT
Start: 2024-02-09 | End: 2024-02-09 | Stop reason: HOSPADM

## 2024-02-09 RX ORDER — OXYCODONE HYDROCHLORIDE 5 MG/1
5 TABLET ORAL
Status: DISCONTINUED | OUTPATIENT
Start: 2024-02-09 | End: 2024-02-09 | Stop reason: HOSPADM

## 2024-02-09 RX ORDER — FENTANYL CITRATE 50 UG/ML
INJECTION, SOLUTION INTRAMUSCULAR; INTRAVENOUS AS NEEDED
Status: DISCONTINUED | OUTPATIENT
Start: 2024-02-09 | End: 2024-02-09 | Stop reason: SURG

## 2024-02-09 RX ORDER — OXYBUTYNIN CHLORIDE 5 MG/1
5 TABLET ORAL 3 TIMES DAILY PRN
Qty: 12 TABLET | Refills: 0 | Status: SHIPPED | OUTPATIENT
Start: 2024-02-09

## 2024-02-09 RX ORDER — MAGNESIUM HYDROXIDE 1200 MG/15ML
LIQUID ORAL AS NEEDED
Status: DISCONTINUED | OUTPATIENT
Start: 2024-02-09 | End: 2024-02-09 | Stop reason: HOSPADM

## 2024-02-09 RX ORDER — EPHEDRINE SULFATE 50 MG/ML
INJECTION, SOLUTION INTRAVENOUS AS NEEDED
Status: DISCONTINUED | OUTPATIENT
Start: 2024-02-09 | End: 2024-02-09 | Stop reason: SURG

## 2024-02-09 RX ORDER — IBUPROFEN 600 MG/1
600 TABLET ORAL EVERY 6 HOURS PRN
Status: DISCONTINUED | OUTPATIENT
Start: 2024-02-09 | End: 2024-02-09 | Stop reason: HOSPADM

## 2024-02-09 RX ORDER — ASPIRIN 81 MG/1
81 TABLET ORAL DAILY
Start: 2024-02-12

## 2024-02-09 RX ORDER — SODIUM CHLORIDE, SODIUM LACTATE, POTASSIUM CHLORIDE, CALCIUM CHLORIDE 600; 310; 30; 20 MG/100ML; MG/100ML; MG/100ML; MG/100ML
9 INJECTION, SOLUTION INTRAVENOUS CONTINUOUS PRN
Status: DISCONTINUED | OUTPATIENT
Start: 2024-02-09 | End: 2024-02-09 | Stop reason: HOSPADM

## 2024-02-09 RX ORDER — PROMETHAZINE HYDROCHLORIDE 12.5 MG/1
25 TABLET ORAL ONCE AS NEEDED
Status: DISCONTINUED | OUTPATIENT
Start: 2024-02-09 | End: 2024-02-09 | Stop reason: HOSPADM

## 2024-02-09 RX ORDER — LIDOCAINE HYDROCHLORIDE 20 MG/ML
INJECTION, SOLUTION EPIDURAL; INFILTRATION; INTRACAUDAL; PERINEURAL AS NEEDED
Status: DISCONTINUED | OUTPATIENT
Start: 2024-02-09 | End: 2024-02-09 | Stop reason: SURG

## 2024-02-09 RX ORDER — MIDAZOLAM HYDROCHLORIDE 2 MG/2ML
1 INJECTION, SOLUTION INTRAMUSCULAR; INTRAVENOUS ONCE
Status: COMPLETED | OUTPATIENT
Start: 2024-02-09 | End: 2024-02-09

## 2024-02-09 RX ORDER — PROMETHAZINE HYDROCHLORIDE 25 MG/1
25 SUPPOSITORY RECTAL ONCE AS NEEDED
Status: DISCONTINUED | OUTPATIENT
Start: 2024-02-09 | End: 2024-02-09 | Stop reason: HOSPADM

## 2024-02-09 RX ORDER — ONDANSETRON 4 MG/1
4 TABLET, ORALLY DISINTEGRATING ORAL ONCE AS NEEDED
Status: DISCONTINUED | OUTPATIENT
Start: 2024-02-09 | End: 2024-02-09 | Stop reason: HOSPADM

## 2024-02-09 RX ORDER — PROPOFOL 10 MG/ML
VIAL (ML) INTRAVENOUS AS NEEDED
Status: DISCONTINUED | OUTPATIENT
Start: 2024-02-09 | End: 2024-02-09 | Stop reason: SURG

## 2024-02-09 RX ADMIN — LEVOFLOXACIN 500 MG: 5 INJECTION, SOLUTION INTRAVENOUS at 14:16

## 2024-02-09 RX ADMIN — SODIUM CHLORIDE, POTASSIUM CHLORIDE, SODIUM LACTATE AND CALCIUM CHLORIDE 9 ML/HR: 600; 310; 30; 20 INJECTION, SOLUTION INTRAVENOUS at 10:56

## 2024-02-09 RX ADMIN — LIDOCAINE HYDROCHLORIDE 40 MG: 20 INJECTION, SOLUTION EPIDURAL; INFILTRATION; INTRACAUDAL; PERINEURAL at 14:18

## 2024-02-09 RX ADMIN — FENTANYL CITRATE 25 MCG: 50 INJECTION, SOLUTION INTRAMUSCULAR; INTRAVENOUS at 14:26

## 2024-02-09 RX ADMIN — ACETAMINOPHEN 1000 MG: 500 TABLET ORAL at 11:10

## 2024-02-09 RX ADMIN — MIDAZOLAM HYDROCHLORIDE 1 MG: 1 INJECTION, SOLUTION INTRAMUSCULAR; INTRAVENOUS at 13:51

## 2024-02-09 RX ADMIN — Medication 100 MCG: at 14:27

## 2024-02-09 RX ADMIN — Medication 50 MCG: at 14:21

## 2024-02-09 RX ADMIN — PROPOFOL 100 MCG/KG/MIN: 10 INJECTION, EMULSION INTRAVENOUS at 14:18

## 2024-02-09 RX ADMIN — EPHEDRINE SULFATE 5 MG: 50 INJECTION INTRAVENOUS at 14:27

## 2024-02-09 RX ADMIN — PROPOFOL 30 MG: 10 INJECTION, EMULSION INTRAVENOUS at 14:18

## 2024-02-09 NOTE — OP NOTE
Preoperative diagnosis  Bladder lesion    Postoperative diagnosis  Bladder lesion    Procedure performed  Cystoscopy, bladder biopsy and fulguration (minor 0.5 cm)    Attending surgeon  Yola Villasenor MD     Anesthesia  MAC    EBL  0 mL    Complications  None    Specimen  Bladder lesion biopsies (x 4)    Findings  Patent urethra; bladder normal capacity with moderate to severe trabeculations and several diverticula; bilateral ectopic ureter; no discrete tumors; discrete patches of increased vascularity and erythema at left lateral wall and right bladder wall; biopsy    Indications  78 y.o. female agreed to undergo the above named procedure after discussion of the alternatives, risks and benefits.   Informed consent was obtained.      Procedure  After proper consent was obtained, patient was taken back to the operating room and MAC anesthesia was performed.  Patient was placed in the dorsolithotomy position and prepped and draped in the normal sterile fashion for cystoscopy.       A 22 Georgian rigid cystoscope was inserted into the bladder.  The bladder was inspected in a systematic meridian fashion using a 30 degree lens.  The bladder was noted to be of normal capacity.  There were moderate to severe trabeculations and several diverticula.  There were no tumors, lesions, stones or other abnormalities seen except for at the left lateral wall along a ridge of an trabeculation were 2 discrete erythematous patches; similar appearing lesion at the right lateral wall.  Lesions were small, less than 0.5 cm.  Both ureteral orifices were in normal appearance.       Biopsies were taken of the suspicious areas using biopsy forceps.  The areas were then fulgurated with Bugbee.  These areas were less than 0.5 cm in size.  Once hemostasis noted be excellent, the bladder was emptied and the cystoscope removed.  The procedure was then deemed terminated.  Patient was awoken from anesthesia and transferred to PACU in good  condition      Signed:  Yola Villasenor MD  02/09/24  15:11 EST
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
## 2024-02-09 NOTE — ANESTHESIA POSTPROCEDURE EVALUATION
Patient: Salome Reeves    Procedure Summary       Date: 02/09/24 Room / Location: [facility] [place] OR 07 / [facility] [place] MAIN OR    Anesthesia Start: 1412 Anesthesia Stop: 1500    Procedure: CYSTOSCOPY TRANSURETHRAL RESECTION OF BLADDER TUMOR Diagnosis:       Lesion of urinary bladder      (Lesion of urinary bladder [N32.9])    Surgeons: Yola Villasenor MD Provider: Jorge L Dela Cruz MD    Anesthesia Type: general ASA Status: 3            Anesthesia Type: general    Vitals  Vitals Value Taken Time   /75 02/09/24 1604   Temp 36.2 °C (97.2 °F) 02/09/24 1530   Pulse 76 02/09/24 1605   Resp 18 02/09/24 1530   SpO2 97 % 02/09/24 1606   Vitals shown include unfiled device data.        Post Anesthesia Care and Evaluation    Patient location during evaluation: bedside  Patient participation: complete - patient participated  Level of consciousness: awake  Pain management: adequate    Airway patency: patent  PONV Status: none  Cardiovascular status: acceptable and stable  Respiratory status: acceptable  Hydration status: acceptable    Comments: An Anesthesiologist personally participated in the most demanding procedures (including induction and emergence if applicable) in the anesthesia plan, monitored the course of anesthesia administration at frequent intervals and remained physically present and available for immediate diagnosis and treatment of emergencies.

## 2024-02-09 NOTE — DISCHARGE INSTRUCTIONS
DISCHARGE INSTRUCTIONS   TRANSURETHRAL RESECTION   OF BLADDER TUMOR            For your surgery you had:  [] General anesthesia (you may have a sore throat for the first 24 hours)  [] IV sedation  [] Local anesthesia  [] Monitored anesthesia care  [] You received a medicated patch for nausea prevention today (behind your ear). It is recommended that you remove it 24-48 hours post-operatively. It must be removed within 72 hours.   You may experience dizziness, drowsiness, or lightheadedness for several hours following surgery.  Do not stay alone today or tonight.  Limit your activity for 24 hours.   You should not drive, operate machinery, drink alcohol, or sign legally binding documents for 24 hours or while you are taking pain medication.   Resume your diet slowly. Follow any special dietary instructions you may have been given by your doctor.         NOTIFY YOUR DOCTOR IF YOU EXPERIENCE ANY OF THE FOLLOWING:  Temperature greater than 101 degrees Fahrenheit  Shaking chills  Nausea, vomiting, and/or pain that is not controlled by prescribed medications  Increase in bleeding or bleeding that is excessive  If unable to reach your doctor, please go to the closest Emergency Room   You will have a catheter to drain your bladder. Catheters are usually removed in 24-48 hours. Wash around the catheter with soap and water and rinse well.   Drink 6 glasses of water a day for 3-4 days.   You may see blood in your urine for up to a week, there may be small blood clots. If so, increase the amount you are drinking.   If you are passing large clots, call your doctor.   Avoid lifting or straining until released by MD.  You may have burning, pain, and frequency of urination for 48 hours after catheter is removed. Call your doctor if it continues longer.   Medications per physician as indicated on discharge medication information sheet.     [x] Last dose of pain medication given at:          SPECIAL  INSTRUCTIONS:

## 2024-02-10 LAB
QT INTERVAL: 403 MS
QTC INTERVAL: 482 MS

## 2024-02-12 ENCOUNTER — TELEPHONE (OUTPATIENT)
Dept: UROLOGY | Facility: CLINIC | Age: 79
End: 2024-02-12
Payer: MEDICARE

## 2024-02-21 ENCOUNTER — TELEPHONE (OUTPATIENT)
Dept: SURGERY | Facility: CLINIC | Age: 79
End: 2024-02-21
Payer: MEDICARE

## 2024-02-22 NOTE — TELEPHONE ENCOUNTER
SPOKE TO PT AND ADVISED THAT HER PATH WAS NEGATIVE FOR CANCER AND WAS INFLAMMATION. ADVISED PT THAT WE WILL CANCEL HER 2/28 FOLLOW UP, AND WE WILL GET HER BACK IN WITH SABA IN ABOUT 6 WEEKS. PT EXPRESSED UNDERSTANDING AND IS AGREEABLE.

## 2024-04-17 RX ORDER — PANTOPRAZOLE SODIUM 20 MG/1
20 TABLET, DELAYED RELEASE ORAL DAILY
Qty: 90 TABLET | Refills: 1 | OUTPATIENT
Start: 2024-04-17

## 2024-04-17 NOTE — TELEPHONE ENCOUNTER
Patient requesting a refill of pantoprazole.  Last o/v-2/28/23  Last refill-1/17/24  Next o/v-none

## 2025-06-26 ENCOUNTER — TELEPHONE (OUTPATIENT)
Dept: GASTROENTEROLOGY | Facility: CLINIC | Age: 80
End: 2025-06-26
Payer: MEDICARE

## 2025-06-26 RX ORDER — BUDESONIDE 3 MG/1
CAPSULE, COATED PELLETS ORAL
Qty: 180 CAPSULE | Refills: 0 | Status: SHIPPED | OUTPATIENT
Start: 2025-06-26 | End: 2025-09-24

## 2025-06-26 NOTE — TELEPHONE ENCOUNTER
Caller: Salome Reeves     Relationship: SELF     Best call back number: [phone]    What is your medical concern? PT IS REPORTING SHE HAS BEEN HAVING DIARRHEA OFF AND ON FOR ABOUT A WEEK NOW AND WANTS TO KNOW WHAT SHE CAN TAKE TO TRY AND HELP IT FROM NOW AND HER APPT ON 07/08/25. PLEASE CALL PT BACK AND ADVISE    How long has this issue been going on? ABOUT WEEK    Is your provider already aware of this issue? NOT IN A LONG TIME    Have you been treated for this issue? NO

## 2025-06-26 NOTE — TELEPHONE ENCOUNTER
"S/w pt she states she has tried imodium and pepto bismol.    She denies dietary changes, New medications, pt denies being around anyone being ill.     Pt denies fever.     She is having some lower abdominal pain.   Pt does not have an appendix or Gallbladder.     Asked pt how often she is having a bowel movement she states this morning she has went 3 times but last week every time she urinated she had some bowel movement \"come out\".     Please advise recommendations.   "

## 2025-07-08 ENCOUNTER — OFFICE VISIT (OUTPATIENT)
Dept: GASTROENTEROLOGY | Facility: CLINIC | Age: 80
End: 2025-07-08
Payer: MEDICARE

## 2025-07-08 VITALS
BODY MASS INDEX: 26.61 KG/M2 | HEART RATE: 105 BPM | WEIGHT: 150.2 LBS | DIASTOLIC BLOOD PRESSURE: 73 MMHG | SYSTOLIC BLOOD PRESSURE: 125 MMHG

## 2025-07-08 DIAGNOSIS — K52.831 COLLAGENOUS COLITIS: Primary | ICD-10-CM

## 2025-07-08 RX ORDER — LANOLIN ALCOHOL/MO/W.PET/CERES
CREAM (GRAM) TOPICAL
COMMUNITY

## 2025-07-08 RX ORDER — ESTRADIOL 0.1 MG/G
1 CREAM VAGINAL 2 TIMES WEEKLY
COMMUNITY
Start: 2025-04-17 | End: 2026-04-17

## 2025-07-08 RX ORDER — AMPICILLIN TRIHYDRATE 250 MG
500 CAPSULE ORAL DAILY
COMMUNITY

## 2025-07-08 NOTE — PROGRESS NOTES
Chief Complaint     Diarrhea and hard stools    Patient or patient representative verbalized consent for the use of Ambient Listening during the visit with  VERÓNICA Srinivasan for chart documentation. 7/8/2025  09:32 EDT      History of Present Illness     History of Present Illness  The patient presents for a follow-up of collagenous colitis. The primary reason for this visit is to address the recurrence of diarrhea. Her last office visit was on 02/28/2023, at which time she was tapering off budesonide. She notified the office last month that she was again experiencing diarrhea. It was recommended that she begin budesonide taper, and that was sent to her pharmacy.    She reports that her diarrhea resumed a few weeks ago. She has been taking budesonide 9 mg daily in the morning, which has helped manage her symptoms. Bowel movements are irregular, occurring throughout the day, sometimes with minimal output. Stools are described as brownish but not overly dark, and they have become firmer. She expresses concern about potential constipation and inquires about possible treatments. Typically, her stools are hard in the mornings. She maintains a high water intake, consuming at least one 40-ounce mug of water daily, along with a couple of cups of coffee and PediaSure.    She also mentions a long-standing issue of lower abdominal pain, which she attributes to previous surgeries. She takes PediaSure for her restless legs, which helps. Additionally, she takes cinnamon capsules, magnesium, and cranberry capsules as she is prone to UTIs.         History      Past Medical History:   Diagnosis Date    Arthritis     Bladder disorder     Diabetes     Hyperlipemia     Hypertension     Kidney disease     Limb swelling     Neurologic disorder     PONV (postoperative nausea and vomiting)     Seasonal allergies     Thyroid disorder     Tremors of nervous system      Past Surgical History:   Procedure Laterality Date     APPENDECTOMY      CATARACT EXTRACTION      COLONOSCOPY  2011    [facility]    COLONOSCOPY N/A 3/31/2022    Procedure: COLONOSCOPY;  Surgeon: Little Mcgee MD;  Location: [facility] ENDOSCOPY;  Service: Gastroenterology;  Laterality: N/A;  DIVERTICULOSIS COLITIS    ENDOSCOPY N/A 3/31/2022    Procedure: ESOPHAGOGASTRODUODENOSCOPY;  Surgeon: Little Mcgee MD;  Location: [facility] ENDOSCOPY;  Service: Gastroenterology;  Laterality: N/A;  GASTRITIS    HYSTERECTOMY      PARATHYROIDECTOMY  2014    TRANSURETHRAL RESECTION OF BLADDER TUMOR N/A 2/9/2024    Procedure: CYSTOSCOPY TRANSURETHRAL RESECTION OF BLADDER TUMOR;  Surgeon: Yola Villasenor MD;  Location: [facility] MAIN OR;  Service: Urology;  Laterality: N/A;    UPPER GASTROINTESTINAL ENDOSCOPY  2011    [facility]     Family History   Problem Relation Age of Onset    Cancer Mother     Cancer Father     Malig Hyperthermia Neg Hx         Current Medications       Current Outpatient Medications:     aspirin 81 MG EC tablet, Take 1 tablet by mouth Daily. If urine clearing and not passing excessive clots, Disp: , Rfl:     bismuth subsalicylate (PEPTO BISMOL) 262 MG/15ML suspension, Take 15 mL by mouth Every 6 (Six) Hours As Needed for Indigestion., Disp: , Rfl:     Budesonide (Entocort EC) 3 MG 24 hr capsule, Take 3 capsules by mouth Every Morning for 30 days, THEN 2 capsules Every Morning for 30 days, THEN 1 capsule Every Morning for 30 days., Disp: 180 capsule, Rfl: 0    Cinnamon 500 MG capsule, Take 1 capsule by mouth Daily., Disp: , Rfl:     CRANBERRY PO, Take  by mouth., Disp: , Rfl:     diphenhydrAMINE-acetaminophen (TYLENOL PM)  MG tablet per tablet, Take 1 tablet by mouth At Night As Needed., Disp: , Rfl:     estradiol (ESTRACE) 0.1 MG/GM vaginal cream, Insert 1 g into the vagina 2 (Two) Times a Week., Disp: , Rfl:     HYDROcodone-acetaminophen (Norco) 5-325 MG per tablet, Take 0.5-1 tablets by mouth Every 6 (Six) Hours As Needed for Severe Pain., Disp: 10  "tablet, Rfl: 0    lovastatin (MEVACOR) 20 MG tablet, Take 1 tablet by mouth Every Night., Disp: , Rfl:     Magnesium Oxide -Mg Supplement 400 (240 Mg) MG tablet, Take  by mouth., Disp: , Rfl:     metFORMIN ER (GLUCOPHAGE-XR) 500 MG 24 hr tablet, Take 2 tablets by mouth Daily. 2 tabs, Disp: , Rfl:     oxybutynin (DITROPAN) 5 MG tablet, Take 1 tablet by mouth 3 (Three) Times a Day As Needed (Bladder spasm; bladder cramping). May cause constipation, Disp: 12 tablet, Rfl: 0    phenazopyridine (Pyridium) 200 MG tablet, Take 1 tablet by mouth 3 (Three) Times a Day As Needed for Bladder Spasms (Burning, urinary discomfort)., Disp: 12 tablet, Rfl: 0    propranolol LA (INDERAL LA) 80 MG 24 hr capsule, Take 1 capsule by mouth Daily., Disp: , Rfl:     saccharomyces boulardii (FLORASTOR) 250 MG capsule, Take 1 capsule by mouth., Disp: , Rfl:     sodium bicarbonate 325 MG tablet, Take 1 tablet by mouth Every Night., Disp: , Rfl:      Allergies     Allergies   Allergen Reactions    Adhesive Tape Other (See Comments)     BLISTERS,SKIN BURN; \"PAPER TAPE IS FINE\"      Amoxicillin-Pot Clavulanate Nausea Only     NAUSEA AND SEVERE DIARRHEA      Iodine Rash     BLISTERS    Betamethasone Unknown - High Severity     Nausea and severe diarrhea   Nausea and severe diarrhea   <paragraph>Nausea and severe diarrhea</paragraph>       Social History       Social History     Social History Narrative    Not on file         Objective       /73 (BP Location: Left arm, Patient Position: Sitting, Cuff Size: Adult)   Pulse 105   Wt 68.1 kg (150 lb 3.2 oz)   BMI 26.61 kg/m²       Physical Exam  Constitutional:       General: She is not in acute distress.     Appearance: Normal appearance. She is well-developed and normal weight.   HENT:      Head: Normocephalic and atraumatic.   Eyes:      Conjunctiva/sclera: Conjunctivae normal.      Pupils: Pupils are equal, round, and reactive to light.      Visual Fields: Right eye visual fields normal and " left eye visual fields normal.   Cardiovascular:      Rate and Rhythm: Normal rate.   Pulmonary:      Effort: Pulmonary effort is normal. No respiratory distress or retractions.      Breath sounds: Normal air entry.   Abdominal:      General: There is no distension.      Tenderness: There is no abdominal tenderness.   Musculoskeletal:         General: Normal range of motion.      Right lower leg: No edema.      Left lower leg: No edema.   Skin:     General: Skin is warm and dry.      Findings: No lesion.   Neurological:      General: No focal deficit present.      Mental Status: She is alert and oriented to person, place, and time.   Psychiatric:         Mood and Affect: Mood and affect normal.         Behavior: Behavior normal.         Results       Result Review :    The following data was reviewed by: VERÓNICA Srinivasan on 07/08/2025:            Results  Labs   - CBC: 04/15/2025, Hemoglobin 14.2, hematocrit 45.8, platelets 208   - CMP: 04/15/2025, Creatinine 1.27, alk phos 73, AST 31, ALT 19, total bilirubin 0.5           Assessment and Plan              Diagnoses and all orders for this visit:    1. Collagenous colitis (Primary)        Assessment & Plan  1. Collagenous colitis:  - Continue budesonide taper: 3 pills every morning for 4 weeks, then reduce to 2 pills every morning, and subsequently to 1 pill every morning.  - Educated on the chronic nature of microscopic colitis and the potential for flare-ups.  - Advised that budesonide reduces inflammation in the mucosa of the colon.  - Recommended over-the-counter stool softener at bedtime if experiencing hard stools in the morning.            Follow Up     Follow Up   Return in about 9 months (around 4/8/2026) for microscopic colitis.  Patient was given instructions and counseling regarding her condition or for health maintenance advice. Please see specific information pulled into the AVS if appropriate.

## 2025-07-08 NOTE — PATIENT INSTRUCTIONS
"What is microscopic colitis?   This is a condition that causes watery diarrhea. It involves the colon, which is another name for the large intestine (figure 1). There are several types of microscopic colitis, including \"lymphocytic colitis\" and \"collagenous colitis.\" These types cause the same symptoms and are treated the same way.  Microscopic colitis happens when the colon gets inflamed. But doctors don't always know what makes the colon inflamed. In some cases, it seems to be related to use of certain medicines, such as nonsteroidal antiinflammatory drugs (\"NSAIDs\").  Microscopic colitis happens most often in adults. Although the symptoms are bothersome, the condition is not life-threatening. It does not lead to serious problems, like cancer.  What are the symptoms of microscopic colitis?   Microscopic colitis causes episodes of diarrhea that is watery and not bloody. Most people have 4 or more watery bowel movements a day. Diarrhea can last weeks to months.  People sometimes have other symptoms, too. These often include weight loss, belly pain, or feeling very tired.  Is there a test for microscopic colitis?   Yes. Your doctor or nurse will do an exam and order different tests to exclude other causes of diarrhea and diagnose microscopic colitis. These can include:  ?Blood tests  ?Lab tests on a sample of bowel movement  ?Colonoscopy or sigmoidoscopy - These are procedures that let the doctor look at the inside of your colon. They put a thin tube with a camera and light on the end into your anus and up into your rectum and colon (figure 2).  During this procedure, the doctor will also do a test called a biopsy. This involves taking small samples of tissue from your colon. Then, another doctor looks at the samples under a microscope to check for microscopic colitis. A biopsy is the only test that can tell for sure if you have microscopic colitis.  How is microscopic colitis treated?   Treatment depends on your " individual situation. It usually involves 1 or more of these:  ?Medicine changes - If your doctor thinks a medicine is related to your symptoms, they will recommend you stop taking it.  ?Anti-diarrhea medicines - These reduce the number of bowel movements you have. An example is loperamide (brand name: Imodium).  ?Steroid medicine called budesonide (brand name: Entocort) - This helps reduce inflammation in the colon.  If these treatments don't help enough to ease your symptoms, tell your doctor or nurse. There are other medicines or treatments that might help.  Some people need treatment for a long time. That's because symptoms sometimes come back after stopping treatment.

## 2025-07-14 ENCOUNTER — TELEPHONE (OUTPATIENT)
Dept: GASTROENTEROLOGY | Facility: CLINIC | Age: 80
End: 2025-07-14
Payer: MEDICARE

## 2025-07-14 NOTE — TELEPHONE ENCOUNTER
Pt contacted the office and states the UC put her on Keflex yesterday for a UTI she wanted to ensure this medication is okay to take in relation to her GI medications. Please advise

## (undated) DEVICE — EGD OR ERCP KIT: Brand: MEDLINE INDUSTRIES, INC.

## (undated) DEVICE — TOWL STRL OR PREWSH COTN 17X27IN BLU DISP STRL PK/4

## (undated) DEVICE — MEDI-VAC NON-CONDUCTIVE SUCTION TUBING: Brand: CARDINAL HEALTH

## (undated) DEVICE — SKIN PREP TRAY W/CHG: Brand: MEDLINE INDUSTRIES, INC.

## (undated) DEVICE — SOL IRR NACL 0.9PCT 3000ML

## (undated) DEVICE — CUP SPECI 4OZ LF STRL

## (undated) DEVICE — CYSTO PACK: Brand: MEDLINE INDUSTRIES, INC.

## (undated) DEVICE — TBG CONN MEDIVAC 1/4IN 10FT

## (undated) DEVICE — Device

## (undated) DEVICE — MAT FLR ABS W/BLU/LINER 56X72IN WHT

## (undated) DEVICE — GLOVE,SURG,SENSICARE SLT,LF,PF,7: Brand: MEDLINE

## (undated) DEVICE — SINGLE-USE BIOPSY FORCEPS: Brand: RADIAL JAW 4

## (undated) DEVICE — Y-TYPE TUR/BLADDER IRRIGATION SET, REGULATING CLAMP

## (undated) DEVICE — TOWEL,OR,DSP,ST,BLUE,STD,4/PK,20PK/CS: Brand: MEDLINE

## (undated) DEVICE — GOWN SURG IMPERV  XLG

## (undated) DEVICE — SOL IRRG H2O PL/BG 1000ML STRL

## (undated) DEVICE — TRY IRR BULB 60ML STRL

## (undated) DEVICE — COLON KIT: Brand: MEDLINE INDUSTRIES, INC.

## (undated) DEVICE — Device: Brand: DEFENDO AIR/WATER/SUCTION AND BIOPSY VALVE

## (undated) DEVICE — GLV SURG SENSICARE SLT PF LF 7 STRL

## (undated) DEVICE — PK CYSTO CUST HAR

## (undated) DEVICE — TRAY,IRRIGATION,BULBSYRINGE,60ML,CSR,PVP: Brand: MEDLINE

## (undated) DEVICE — GOWN,REINFORCE,POLY,SIRUS,BREATH SLV,XLG: Brand: MEDLINE

## (undated) DEVICE — TRY SKINPREP SCRB CHG